# Patient Record
Sex: FEMALE | Race: BLACK OR AFRICAN AMERICAN | NOT HISPANIC OR LATINO | URBAN - METROPOLITAN AREA
[De-identification: names, ages, dates, MRNs, and addresses within clinical notes are randomized per-mention and may not be internally consistent; named-entity substitution may affect disease eponyms.]

---

## 2020-09-17 ENCOUNTER — INPATIENT (INPATIENT)
Facility: HOSPITAL | Age: 55
LOS: 1 days | Discharge: HOME | DRG: 871 | End: 2020-09-19
Attending: STUDENT IN AN ORGANIZED HEALTH CARE EDUCATION/TRAINING PROGRAM | Admitting: STUDENT IN AN ORGANIZED HEALTH CARE EDUCATION/TRAINING PROGRAM
Payer: COMMERCIAL

## 2020-09-17 VITALS
TEMPERATURE: 98 F | RESPIRATION RATE: 18 BRPM | WEIGHT: 100.09 LBS | DIASTOLIC BLOOD PRESSURE: 86 MMHG | HEART RATE: 118 BPM | HEIGHT: 59 IN | OXYGEN SATURATION: 98 % | SYSTOLIC BLOOD PRESSURE: 145 MMHG

## 2020-09-17 LAB
ALBUMIN SERPL ELPH-MCNC: 3.5 G/DL — SIGNIFICANT CHANGE UP (ref 3.3–5)
ALP SERPL-CCNC: 111 U/L — SIGNIFICANT CHANGE UP (ref 40–120)
ALT FLD-CCNC: 13 U/L — SIGNIFICANT CHANGE UP (ref 10–45)
ANION GAP SERPL CALC-SCNC: 15 MMOL/L — SIGNIFICANT CHANGE UP (ref 5–17)
APPEARANCE UR: ABNORMAL
AST SERPL-CCNC: 15 U/L — SIGNIFICANT CHANGE UP (ref 10–40)
B-OH-BUTYR SERPL-SCNC: 0.8 MMOL/L — HIGH
BASE EXCESS BLDV CALC-SCNC: 7.4 MMOL/L — SIGNIFICANT CHANGE UP
BASOPHILS # BLD AUTO: 0.07 K/UL — SIGNIFICANT CHANGE UP (ref 0–0.2)
BASOPHILS NFR BLD AUTO: 0.4 % — SIGNIFICANT CHANGE UP (ref 0–2)
BILIRUB SERPL-MCNC: 0.5 MG/DL — SIGNIFICANT CHANGE UP (ref 0.2–1.2)
BILIRUB UR-MCNC: NEGATIVE — SIGNIFICANT CHANGE UP
BUN SERPL-MCNC: 13 MG/DL — SIGNIFICANT CHANGE UP (ref 7–23)
CALCIUM SERPL-MCNC: 9.7 MG/DL — SIGNIFICANT CHANGE UP (ref 8.4–10.5)
CHLORIDE SERPL-SCNC: 86 MMOL/L — LOW (ref 96–108)
CO2 SERPL-SCNC: 30 MMOL/L — SIGNIFICANT CHANGE UP (ref 22–31)
COLOR SPEC: YELLOW — SIGNIFICANT CHANGE UP
CREAT SERPL-MCNC: 0.75 MG/DL — SIGNIFICANT CHANGE UP (ref 0.5–1.3)
DIFF PNL FLD: ABNORMAL
EOSINOPHIL # BLD AUTO: 0.03 K/UL — SIGNIFICANT CHANGE UP (ref 0–0.5)
EOSINOPHIL NFR BLD AUTO: 0.2 % — SIGNIFICANT CHANGE UP (ref 0–6)
GAS PNL BLDV: SIGNIFICANT CHANGE UP
GLUCOSE BLDC GLUCOMTR-MCNC: 272 MG/DL — HIGH (ref 70–99)
GLUCOSE SERPL-MCNC: 507 MG/DL — CRITICAL HIGH (ref 70–99)
GLUCOSE UR QL: >=1000
HCO3 BLDV-SCNC: 32 MMOL/L — HIGH (ref 20–27)
HCT VFR BLD CALC: 34.5 % — SIGNIFICANT CHANGE UP (ref 34.5–45)
HGB BLD-MCNC: 11.4 G/DL — LOW (ref 11.5–15.5)
IMM GRANULOCYTES NFR BLD AUTO: 0.8 % — SIGNIFICANT CHANGE UP (ref 0–1.5)
INR BLD: 1.24 — HIGH (ref 0.88–1.16)
KETONES UR-MCNC: 15 MG/DL
LACTATE SERPL-SCNC: 1.3 MMOL/L — SIGNIFICANT CHANGE UP (ref 0.5–2)
LACTATE SERPL-SCNC: 2.9 MMOL/L — HIGH (ref 0.5–2)
LEUKOCYTE ESTERASE UR-ACNC: NEGATIVE — SIGNIFICANT CHANGE UP
LYMPHOCYTES # BLD AUTO: 1.85 K/UL — SIGNIFICANT CHANGE UP (ref 1–3.3)
LYMPHOCYTES # BLD AUTO: 10.2 % — LOW (ref 13–44)
MCHC RBC-ENTMCNC: 28 PG — SIGNIFICANT CHANGE UP (ref 27–34)
MCHC RBC-ENTMCNC: 33 GM/DL — SIGNIFICANT CHANGE UP (ref 32–36)
MCV RBC AUTO: 84.8 FL — SIGNIFICANT CHANGE UP (ref 80–100)
MONOCYTES # BLD AUTO: 0.74 K/UL — SIGNIFICANT CHANGE UP (ref 0–0.9)
MONOCYTES NFR BLD AUTO: 4.1 % — SIGNIFICANT CHANGE UP (ref 2–14)
NEUTROPHILS # BLD AUTO: 15.26 K/UL — HIGH (ref 1.8–7.4)
NEUTROPHILS NFR BLD AUTO: 84.3 % — HIGH (ref 43–77)
NITRITE UR-MCNC: NEGATIVE — SIGNIFICANT CHANGE UP
NRBC # BLD: 0 /100 WBCS — SIGNIFICANT CHANGE UP (ref 0–0)
OSMOLALITY SERPL: 302 MOSM/KG — HIGH (ref 275–300)
PCO2 BLDV: 47 MMHG — SIGNIFICANT CHANGE UP (ref 41–51)
PH BLDV: 7.45 — HIGH (ref 7.32–7.43)
PH UR: 6 — SIGNIFICANT CHANGE UP (ref 5–8)
PLATELET # BLD AUTO: 470 K/UL — HIGH (ref 150–400)
PO2 BLDV: 30 MMHG — SIGNIFICANT CHANGE UP
POTASSIUM SERPL-MCNC: 3.9 MMOL/L — SIGNIFICANT CHANGE UP (ref 3.5–5.3)
POTASSIUM SERPL-SCNC: 3.9 MMOL/L — SIGNIFICANT CHANGE UP (ref 3.5–5.3)
PROT SERPL-MCNC: 8.5 G/DL — HIGH (ref 6–8.3)
PROT UR-MCNC: ABNORMAL MG/DL
PROTHROM AB SERPL-ACNC: 14.7 SEC — HIGH (ref 10.6–13.6)
RBC # BLD: 4.07 M/UL — SIGNIFICANT CHANGE UP (ref 3.8–5.2)
RBC # FLD: 12.4 % — SIGNIFICANT CHANGE UP (ref 10.3–14.5)
SAO2 % BLDV: 59 % — SIGNIFICANT CHANGE UP
SARS-COV-2 RNA SPEC QL NAA+PROBE: SIGNIFICANT CHANGE UP
SODIUM SERPL-SCNC: 131 MMOL/L — LOW (ref 135–145)
SP GR SPEC: <=1.005 — SIGNIFICANT CHANGE UP (ref 1–1.03)
UROBILINOGEN FLD QL: 1 E.U./DL — SIGNIFICANT CHANGE UP
WBC # BLD: 18.09 K/UL — HIGH (ref 3.8–10.5)
WBC # FLD AUTO: 18.09 K/UL — HIGH (ref 3.8–10.5)

## 2020-09-17 PROCEDURE — 74177 CT ABD & PELVIS W/CONTRAST: CPT | Mod: 26

## 2020-09-17 PROCEDURE — 93010 ELECTROCARDIOGRAM REPORT: CPT

## 2020-09-17 PROCEDURE — 99285 EMERGENCY DEPT VISIT HI MDM: CPT

## 2020-09-17 PROCEDURE — 99223 1ST HOSP IP/OBS HIGH 75: CPT | Mod: GC

## 2020-09-17 RX ORDER — ONDANSETRON 8 MG/1
4 TABLET, FILM COATED ORAL ONCE
Refills: 0 | Status: COMPLETED | OUTPATIENT
Start: 2020-09-17 | End: 2020-09-17

## 2020-09-17 RX ORDER — POLYETHYLENE GLYCOL 3350 17 G/17G
17 POWDER, FOR SOLUTION ORAL
Refills: 0 | Status: DISCONTINUED | OUTPATIENT
Start: 2020-09-17 | End: 2020-09-19

## 2020-09-17 RX ORDER — OXYCODONE HYDROCHLORIDE 5 MG/1
5 TABLET ORAL EVERY 4 HOURS
Refills: 0 | Status: DISCONTINUED | OUTPATIENT
Start: 2020-09-17 | End: 2020-09-18

## 2020-09-17 RX ORDER — INSULIN LISPRO 100/ML
VIAL (ML) SUBCUTANEOUS AT BEDTIME
Refills: 0 | Status: DISCONTINUED | OUTPATIENT
Start: 2020-09-17 | End: 2020-09-18

## 2020-09-17 RX ORDER — ACETAMINOPHEN 500 MG
650 TABLET ORAL EVERY 6 HOURS
Refills: 0 | Status: DISCONTINUED | OUTPATIENT
Start: 2020-09-17 | End: 2020-09-19

## 2020-09-17 RX ORDER — CEFTRIAXONE 500 MG/1
1000 INJECTION, POWDER, FOR SOLUTION INTRAMUSCULAR; INTRAVENOUS ONCE
Refills: 0 | Status: COMPLETED | OUTPATIENT
Start: 2020-09-17 | End: 2020-09-17

## 2020-09-17 RX ORDER — SODIUM CHLORIDE 9 MG/ML
1000 INJECTION INTRAMUSCULAR; INTRAVENOUS; SUBCUTANEOUS ONCE
Refills: 0 | Status: COMPLETED | OUTPATIENT
Start: 2020-09-17 | End: 2020-09-17

## 2020-09-17 RX ORDER — GLUCAGON INJECTION, SOLUTION 0.5 MG/.1ML
1 INJECTION, SOLUTION SUBCUTANEOUS ONCE
Refills: 0 | Status: DISCONTINUED | OUTPATIENT
Start: 2020-09-17 | End: 2020-09-19

## 2020-09-17 RX ORDER — DEXTROSE 50 % IN WATER 50 %
25 SYRINGE (ML) INTRAVENOUS ONCE
Refills: 0 | Status: DISCONTINUED | OUTPATIENT
Start: 2020-09-17 | End: 2020-09-19

## 2020-09-17 RX ORDER — INSULIN GLARGINE 100 [IU]/ML
6 INJECTION, SOLUTION SUBCUTANEOUS AT BEDTIME
Refills: 0 | Status: DISCONTINUED | OUTPATIENT
Start: 2020-09-18 | End: 2020-09-18

## 2020-09-17 RX ORDER — ACETAMINOPHEN 500 MG
1000 TABLET ORAL ONCE
Refills: 0 | Status: COMPLETED | OUTPATIENT
Start: 2020-09-17 | End: 2020-09-17

## 2020-09-17 RX ORDER — SODIUM CHLORIDE 9 MG/ML
1000 INJECTION, SOLUTION INTRAVENOUS
Refills: 0 | Status: DISCONTINUED | OUTPATIENT
Start: 2020-09-17 | End: 2020-09-19

## 2020-09-17 RX ORDER — DEXTROSE 50 % IN WATER 50 %
15 SYRINGE (ML) INTRAVENOUS ONCE
Refills: 0 | Status: DISCONTINUED | OUTPATIENT
Start: 2020-09-17 | End: 2020-09-19

## 2020-09-17 RX ORDER — IOHEXOL 300 MG/ML
30 INJECTION, SOLUTION INTRAVENOUS ONCE
Refills: 0 | Status: COMPLETED | OUTPATIENT
Start: 2020-09-17 | End: 2020-09-17

## 2020-09-17 RX ORDER — INSULIN GLARGINE 100 [IU]/ML
6 INJECTION, SOLUTION SUBCUTANEOUS ONCE
Refills: 0 | Status: COMPLETED | OUTPATIENT
Start: 2020-09-17 | End: 2020-09-17

## 2020-09-17 RX ORDER — INSULIN LISPRO 100/ML
VIAL (ML) SUBCUTANEOUS
Refills: 0 | Status: DISCONTINUED | OUTPATIENT
Start: 2020-09-17 | End: 2020-09-18

## 2020-09-17 RX ORDER — LANOLIN ALCOHOL/MO/W.PET/CERES
5 CREAM (GRAM) TOPICAL AT BEDTIME
Refills: 0 | Status: DISCONTINUED | OUTPATIENT
Start: 2020-09-17 | End: 2020-09-19

## 2020-09-17 RX ORDER — INFLUENZA VIRUS VACCINE 15; 15; 15; 15 UG/.5ML; UG/.5ML; UG/.5ML; UG/.5ML
0.5 SUSPENSION INTRAMUSCULAR ONCE
Refills: 0 | Status: DISCONTINUED | OUTPATIENT
Start: 2020-09-17 | End: 2020-09-19

## 2020-09-17 RX ORDER — DEXTROSE 50 % IN WATER 50 %
12.5 SYRINGE (ML) INTRAVENOUS ONCE
Refills: 0 | Status: DISCONTINUED | OUTPATIENT
Start: 2020-09-17 | End: 2020-09-19

## 2020-09-17 RX ORDER — SENNA PLUS 8.6 MG/1
2 TABLET ORAL AT BEDTIME
Refills: 0 | Status: DISCONTINUED | OUTPATIENT
Start: 2020-09-17 | End: 2020-09-19

## 2020-09-17 RX ORDER — CALCIUM CARBONATE 500(1250)
3 TABLET ORAL EVERY 6 HOURS
Refills: 0 | Status: DISCONTINUED | OUTPATIENT
Start: 2020-09-17 | End: 2020-09-19

## 2020-09-17 RX ORDER — INSULIN HUMAN 100 [IU]/ML
10 INJECTION, SOLUTION SUBCUTANEOUS ONCE
Refills: 0 | Status: COMPLETED | OUTPATIENT
Start: 2020-09-17 | End: 2020-09-17

## 2020-09-17 RX ORDER — ONDANSETRON 8 MG/1
4 TABLET, FILM COATED ORAL ONCE
Refills: 0 | Status: DISCONTINUED | OUTPATIENT
Start: 2020-09-17 | End: 2020-09-19

## 2020-09-17 RX ORDER — INSULIN LISPRO 100/ML
2 VIAL (ML) SUBCUTANEOUS
Refills: 0 | Status: DISCONTINUED | OUTPATIENT
Start: 2020-09-17 | End: 2020-09-18

## 2020-09-17 RX ADMIN — Medication 1: at 23:49

## 2020-09-17 RX ADMIN — CEFTRIAXONE 100 MILLIGRAM(S): 500 INJECTION, POWDER, FOR SOLUTION INTRAMUSCULAR; INTRAVENOUS at 16:42

## 2020-09-17 RX ADMIN — CEFTRIAXONE 1000 MILLIGRAM(S): 500 INJECTION, POWDER, FOR SOLUTION INTRAMUSCULAR; INTRAVENOUS at 22:25

## 2020-09-17 RX ADMIN — Medication 1000 MILLIGRAM(S): at 19:28

## 2020-09-17 RX ADMIN — SODIUM CHLORIDE 1000 MILLILITER(S): 9 INJECTION INTRAMUSCULAR; INTRAVENOUS; SUBCUTANEOUS at 19:18

## 2020-09-17 RX ADMIN — Medication 1000 MILLIGRAM(S): at 18:57

## 2020-09-17 RX ADMIN — Medication 1000 MILLIGRAM(S): at 23:39

## 2020-09-17 RX ADMIN — SODIUM CHLORIDE 1000 MILLILITER(S): 9 INJECTION INTRAMUSCULAR; INTRAVENOUS; SUBCUTANEOUS at 16:05

## 2020-09-17 RX ADMIN — INSULIN HUMAN 10 UNIT(S): 100 INJECTION, SOLUTION SUBCUTANEOUS at 16:43

## 2020-09-17 RX ADMIN — IOHEXOL 30 MILLILITER(S): 300 INJECTION, SOLUTION INTRAVENOUS at 16:06

## 2020-09-17 RX ADMIN — OXYCODONE HYDROCHLORIDE 5 MILLIGRAM(S): 5 TABLET ORAL at 23:38

## 2020-09-17 RX ADMIN — ONDANSETRON 4 MILLIGRAM(S): 8 TABLET, FILM COATED ORAL at 16:06

## 2020-09-17 NOTE — ED PROVIDER NOTE - CLINICAL SUMMARY MEDICAL DECISION MAKING FREE TEXT BOX
Pt p/w hyperglycemia, anorexia, vomiting, weight loss, ? hx malignancy. DDx includes but not limited to hyperglycemia, DKA, HONK, malignancy, gastritis, pancreatitis, less likely other pathology, Check labs, CT a/p, UA, IVF, NPO, insulin. Dispo pending w/u and clinical status

## 2020-09-17 NOTE — ED ADULT NURSE REASSESSMENT NOTE - NS ED NURSE REASSESS COMMENT FT1
Pt alert and oriented X4. Pt asking to eat. Refuses ER sandwiches as well as yogurt. Daughter ordering food for pt. Second lactate sent. Pt ambulated to the bathroom with assistance, stable gait. Pt awaiting for bed assignment.

## 2020-09-17 NOTE — H&P ADULT - NSHPPHYSICALEXAM_GEN_ALL_CORE
VITAL SIGNS:  T(C): 37.1 (09-18-20 @ 01:27), Max: 38.6 (09-17-20 @ 18:29)  T(F): 98.8 (09-18-20 @ 01:27), Max: 101.4 (09-17-20 @ 18:29)  HR: 111 (09-17-20 @ 23:01) (110 - 124)  BP: 108/68 (09-17-20 @ 23:01) (108/68 - 167/93)  BP(mean): --  RR: 18 (09-17-20 @ 23:01) (18 - 18)  SpO2: 98% (09-17-20 @ 23:01) (97% - 100%)  Wt(kg): --    PHYSICAL EXAM:    Constitutional: Adult Black female, mildly toxic-appearing, in no acute distress, BMI decreased, on room air  Head: NC/AT  Eyes: PERRL, EOMI, anicteric sclera  ENT: MMM  Neck: no JVD  Respiratory: room air, CTAB  Cardiac: RRR  Gastrointestinal: soft, NT/ND; no rebound or guarding; + CVA TTP  Extremities: WWP, no cyanosis; no peripheral edema  Musculoskeletal: no joint swelling, tenderness or erythema  Vascular: 2+ radial, PT pulses bilaterally  Dermatologic: skin warm, dry and intact; no rashes, wounds, or scars  Neurologic: AAOx3; cranial nerves grossly intact; no gross focal deficits  Psychiatric: affect and characteristics of appearance, verbalizations, behaviors are appropriate

## 2020-09-17 NOTE — H&P ADULT - HISTORY OF PRESENT ILLNESS
Mrs. Sidhu is a 56 yo F with PMH T2DM (insulin degludec, dulaglutide, sitagliptin, metformin, A1c 11% in spite of compliance), who presents with a 10-day history of nausea, vomiting and anorexia. She reports she has been trying everything her doctor has recommended to try to control her diabetes, but she still has had persistently high A1c. She does not test blood sugars at home and was never prescribed a meter or taught how to test, in spite of being more than willing to "try anything" to avoid complications of diabetes. Starting 10 days ago she started to have back pain on the R side, 10/10 in intensity, colicky, non-radiating. This was associated with anorexia, nausea and vomiting; daughter at bedside states "She hasn't eaten in 10 days." She affirms polydipsia, polyuria, and weight loss chronically over last several months. She denies dysuria or hematuria.     ED Vitals: 101.4 (rectal), 118, 145/86, 18, 98% on RA. WBC 18.1, Hgb 11.4, lactate 2.9, sodium nominally 131 (138 corrected for hyperglycemia), Cl 86, HCO3 30, BUN/Cr 13/.75, glucose 507. CT with R pyelo and air in lumen of urinary bladder attributed to possible air-producing infection. Received Tylenol 1g, CTX 1g, insulin regular 10 U IV, Zofran 4 mg, NS 2L.

## 2020-09-17 NOTE — H&P ADULT - PROBLEM SELECTOR PLAN 1
# Severe sepsis secondary to pyelonephritis with emphysema of the bladder in absence of instrumentation  SIRS 3/4 (WBC 18.1, febrile to 101.4, tachycardic 118), lactate 2.9, cleared to 1.3 after 2L NS.  - CTX 2 g q24h until bacteremia can be excluded, can then deescalate to CTX 1 g  - f/u BCx, UCx sent 9/17 (admission)

## 2020-09-17 NOTE — H&P ADULT - NSHPSOCIALHISTORY_GEN_ALL_CORE
Lives independently in the community. Daughter works in cath lab at [employer] [employer] [employer] [facility] [employer].

## 2020-09-17 NOTE — ED ADULT NURSE NOTE - NSIMPLEMENTINTERV_GEN_ALL_ED
Implemented All Universal Safety Interventions:  [place] to call system. Call bell, personal items and telephone within reach. Instruct patient to call for assistance. Room bathroom lighting operational. Non-slip footwear when patient is off stretcher. Physically safe environment: no spills, clutter or unnecessary equipment. Stretcher in lowest position, wheels locked, appropriate side rails in place.

## 2020-09-17 NOTE — H&P ADULT - NSICDXFAMILYHX_GEN_ALL_CORE_FT
FAMILY HISTORY:  Family history of pancreatic cancer, Father,  age 69.  Family history of type 1 diabetes mellitus, Maternal, onset age 60  Family history of type 2 diabetes mellitus in brother  Family history of type 2 diabetes mellitus in sister

## 2020-09-17 NOTE — H&P ADULT - PROBLEM SELECTOR PLAN 6
# Severe protein-calorie malnutrition secondary to uncontrolled diabetes, needs consistent-CHO diet and insulin therapy  Weight loss most likely due to diabetes but cannot exclude underlying malignancy when weight loss is this severe, so needs referral for age-appropriate cancer screenings upon discharge.  - monitor phos and lytes closely for s/sx refeeding syndrome

## 2020-09-17 NOTE — ED PROVIDER NOTE - PHYSICAL EXAMINATION
Limited PE performed in the setting of the COVID10 pandemic, in efforts to limit exposure and cross-contamination  Constitutional: Well appearing, well nourished, awake, alert, oriented to person, place, time/situation and in no apparent distress.  ENMT: Airway patent. Mildly dry MM  Eyes: Clear bilaterally  Cardiac: Normal rate, regular rhythm.   Respiratory: No increased WOB, tachypnea, hypoxia, or accessory mm use. Pt speaks in full sentences.   Gastrointestinal: Abd soft, ND. + mild ttp in the epigastric region. No Adames's. No guarding, rebound, or rigidity. no CVAT. No pulsatile abd masses  Musculoskeletal: Range of motion is not limited  Neuro: Alert and oriented x 3, face symmetric and speech fluent. Nml gross motor movement, grossly non focal   Skin: Skin normal color for race, warm, dry and intact. No evidence of rash.  Psych: Alert and oriented to person, place, time/situation. normal mood and affect. no apparent risk to self or others.

## 2020-09-17 NOTE — H&P ADULT - NSHPLABSRESULTS_GEN_ALL_CORE
.  LABS:                         11.4   18.09 )-----------( 470      ( 17 Sep 2020 15:27 )             34.5     09-17    131<L>  |  86<L>  |  13  ----------------------------<  507<HH>  3.9   |  30  |  0.75    Ca    9.7      17 Sep 2020 15:27  Mg     2.0     09-17    TPro  8.5<H>  /  Alb  3.5  /  TBili  0.5  /  DBili  x   /  AST  15  /  ALT  13  /  AlkPhos  111  09-17    PT/INR - ( 17 Sep 2020 15:27 )   PT: 14.7 sec;   INR: 1.24            Urinalysis Basic - ( 17 Sep 2020 15:50 )    Color: Yellow / Appearance: SL Cloudy / SG: <=1.005 / pH: x  Gluc: x / Ketone: 15 mg/dL  / Bili: Negative / Urobili: 1.0 E.U./dL   Blood: x / Protein: Trace mg/dL / Nitrite: NEGATIVE   Leuk Esterase: NEGATIVE / RBC: < 5 /HPF / WBC Many /HPF   Sq Epi: x / Non Sq Epi: 5-10 /HPF / Bacteria: Present /HPF    Lactate, Blood: 1.3 mmol/L (09-17 @ 21:24)  Lactate, Blood: 2.9 mmol/L (09-17 @ 18:45)      RADIOLOGY, EKG & ADDITIONAL TESTS: Reviewed.     CT abd/pelvis with PO/IV contrast:  Impression:  1.  Findings consistent with acute right-sided pyelonephritis. Air in the urinary bladder, which may be related to recent instrumentation or air producing infection.  2.  Cholelithiasis.

## 2020-09-17 NOTE — H&P ADULT - ASSESSMENT
Mrs. Sidhu is a 56 yo F with PMH of T2DM, inadequately controlled on current regimen in spite of patient compliance, presenting with severe sepsis secondary to pyelonephritis secondary to gross glucosuria secondary to inadequate glycemic control.     # Severe sepsis secondary to pyelonephritis with emphysema of the bladder in absence of instrumentation  SIRS 3/4 (WBC 18.1, febrile to 101.4, tachycardic 118), lactate 2.9, cleared to 1.3 after 2L NS.  - CTX 2 g q24h until bacteremia can be excluded, can then deescalate to CTX 1 g  - f/u BCx, UCx sent 9/17 (admission)    # Anemia, normocytic  Hgb 11.4 but given thrombocytosis and other labs consistent with hemoconcentration, so expect Hgb to decrease in AM. MCV 84. Normal RDW. Unclear etiology; no s/sx bleeding; could be inflammatory milieu of pyelo decreasing EPO, but cannot exclude occult malignancy. While creatinine within normal range would consider possibility of an element of CKD or MAGGY contributing.   - f/u reticulocyte count, iron studies with AM labs  - referral for age-appropriate cancer screenings upon discharge    # T2DM, poorly controlled in spite of medication noncompliance  Given her A1c she most likely should be d/c on basal bolus insulin and is agreeable. Would continue metformin on d/c at appropriate dose (ideally 1000 mg BID); typically no other oral anti-diabetic agent is continued when insulin therapy is initiated except metformin. Metformin should only be discontinued in the event that the patient is determined to be a type 1 diabetic based on low C-peptide and/or positive auto-antibodies, or in patients with GFR <30. This patient has no element of renal dysfunction. There is no role for sitagliptin or dulaglutide if patient is d/c on appropriate insulin and metformin; additionally, dulaglutide is likely exacerbating her weight loss secondary to uncontrolled diabetes by inhibiting appetite via GLP-1 agonism.   - given history of both T2DM and stated T1DM in mother, will sent anti-SANTI, anti-islet, anti-insulin Ab  - f/u C-peptide  - diabetes education consult in AM  - endocrine consult in AM, patient would like to establish with an outpatient endocrinologist to better control her T2DM    # Severe protein-calorie malnutrition secondary to uncontrolled diabetes, needs consistent-CHO diet and insulin therapy  Weight loss most likely due to diabetes but cannot exclude underlying malignancy when weight loss is this severe, so needs referral for age-appropriate cancer screenings upon discharge.    # FEN  F: LR @ 80 x12 hours while still nauseous  E: replete generously in setting of up-titration of insulin, insulin will cause both K and Mg to move intracellularly  N: consistent CHO with evening snack    # Need for prophylactic measure  DVT ppx: Lovenox, SCDs  GI: none indicated    Dispo: [facility]    CODE STATUS: FULL CODE Mrs. Sidhu is a 54 yo F with PMH of T2DM, inadequately controlled on current regimen in spite of patient compliance, presenting with severe sepsis secondary to pyelonephritis secondary to gross glucosuria secondary to inadequate glycemic control.     # Severe sepsis secondary to pyelonephritis with emphysema of the bladder in absence of instrumentation  SIRS 3/4 (WBC 18.1, febrile to 101.4, tachycardic 118), lactate 2.9, cleared to 1.3 after 2L NS.  - CTX 2 g q24h until bacteremia can be excluded, can then deescalate to CTX 1 g  - f/u BCx, UCx sent 9/17 (admission)    # Anemia, normocytic  Hgb 11.4 but given thrombocytosis and other labs consistent with hemoconcentration, so expect Hgb to decrease in AM. MCV 84. Normal RDW. Unclear etiology; no s/sx bleeding; could be inflammatory milieu of pyelo decreasing EPO, but cannot exclude occult malignancy. While creatinine within normal range would consider possibility of an element of CKD or MAGGY contributing.   - f/u reticulocyte count, iron studies with AM labs  - referral for age-appropriate cancer screenings upon discharge    # T2DM with hyperglycemia, poorly controlled in spite of medication noncompliance  Glucose 552, improving markedly s/p 10 U IV insulin in ED. Given her A1c she most likely should be d/c on basal bolus insulin and is agreeable. Would continue metformin on d/c at appropriate dose (ideally 1000 mg BID); typically no other oral anti-diabetic agent is continued when insulin therapy is initiated except metformin. Metformin should only be discontinued in the event that the patient is determined to be a type 1 diabetic based on low C-peptide and/or positive auto-antibodies, or in patients with GFR <30. This patient has no element of renal dysfunction. There is no role for sitagliptin or dulaglutide if patient is d/c on appropriate insulin and metformin; additionally, dulaglutide is likely exacerbating her weight loss secondary to uncontrolled diabetes by inhibiting appetite via GLP-1 agonism.   - given history of both T2DM and stated T1DM in mother, will sent anti-SANTI, anti-islet, anti-insulin Ab  - f/u C-peptide to assess endogenous insulin secretory capacity  - Lantus 6 qhs given before patient's home dose Lantus known to be 25 u qhs, may need NPH 6 in the AM to bridge to Lantus 12 qhs tomorrow night  - start lispro 4 u TID AC tomorrow  - MDSSI  - diabetes education consult in AM  - endocrine consult in AM, patient would like to establish with an outpatient endocrinologist to better control her T2DM    # Metabolic alkalosis, hypochloremic  Consistent with vomiting (loss stomach HCl causes loss of chloride anion, and loss of H+ causes accumulation of HCO3) mixed with contraction alkalosis of hypovolemia.     # Severe protein-calorie malnutrition secondary to uncontrolled diabetes, needs consistent-CHO diet and insulin therapy  Weight loss most likely due to diabetes but cannot exclude underlying malignancy when weight loss is this severe, so needs referral for age-appropriate cancer screenings upon discharge.  - monitor phos and lytes closely for s/sx refeeding syndrome    # FEN  F: LR @ 80 x12 hours while still nauseous  E: replete generously in setting of up-titration of insulin, insulin will cause both K and Mg to move intracellularly, and refeeding can dramatically decrease phos as ATP is generated and inorganic phosphate is used up  N: consistent CHO with evening snack    # Need for prophylactic measure  DVT ppx: Lovenox, SCDs  GI: none indicated    Dispo: [facility]    CODE STATUS: FULL CODE Mrs. Sidhu is a 56 yo F with PMH of T2DM, inadequately controlled on current regimen in spite of patient compliance, presenting with severe sepsis secondary to pyelonephritis secondary to gross glucosuria secondary to inadequate glycemic control.

## 2020-09-17 NOTE — H&P ADULT - PROBLEM SELECTOR PLAN 3
# T2DM with hyperglycemia, poorly controlled in spite of medication noncompliance  Glucose 552, improving markedly s/p 10 U IV insulin in ED. Given her A1c she most likely should be d/c on basal bolus insulin and is agreeable. Would continue metformin on d/c at appropriate dose (ideally 1000 mg BID); typically no other oral anti-diabetic agent is continued when insulin therapy is initiated except metformin. Metformin should only be discontinued in the event that the patient is determined to be a type 1 diabetic based on low C-peptide and/or positive auto-antibodies, or in patients with GFR <30. This patient has no element of renal dysfunction. There is no role for sitagliptin or dulaglutide if patient is d/c on appropriate insulin and metformin; additionally, dulaglutide is likely exacerbating her weight loss secondary to uncontrolled diabetes by inhibiting appetite via GLP-1 agonism.   - given history of both T2DM and stated T1DM in mother, will sent anti-SANTI, anti-islet, anti-insulin Ab  - f/u C-peptide to assess endogenous insulin secretory capacity  - Lantus 6 qhs given before patient's home dose Lantus known to be 25 u qhs, may need NPH 6 in the AM to bridge to Lantus 12 qhs tomorrow night  - start lispro 4 u TID AC tomorrow  - MDSSI  - diabetes education consult in AM  - endocrine consult in AM, patient would like to establish with an outpatient endocrinologist to better control her T2DM

## 2020-09-17 NOTE — ED ADULT TRIAGE NOTE - OTHER COMPLAINTS
patient presents c/o vomiting, poor PO intake "I haven't eaten since september 7th"-- denies fevers, endorsing urinary frequency, with hx of DM

## 2020-09-17 NOTE — H&P ADULT - ATTENDING COMMENTS
patient seen and examined overnight     reviewed pertinent data , h&p    PE findings as above except in NAD, thin appearing; reports mild LUQ discomfort on palpation; +mild R sided CVA tenderness     1. severe sepsis 2/2 R sided pyelo: on ceftriaxone, followup ctxs  2. uncontrolled DM: on lantus and PO DM meds at home;  started on basal / bolus regimen overnight, followup endo recs     rest of  plan as above, with noted addenda

## 2020-09-17 NOTE — ED ADULT NURSE NOTE - OBJECTIVE STATEMENT
56 y/o female w/ hx of DM on insulin and metformin presents to the ED c/o intermittent vomiting that began on 9/7/2020 associated w/ nausea, decreased appetite and increased urination. Pt reportedly stopped taking Insulin 2 days ago because she was not eating. Pt states, "I just don't feel myself." Pt feels generalized malaise. Denies fever, chills, cough, CP, SOB, abdominal pain, diarrhea, dysuria, hematuria, back pain, headache, dizziness, vision changes, and any other associated sx.

## 2020-09-17 NOTE — H&P ADULT - NSHPREVIEWOFSYSTEMS_GEN_ALL_CORE
REVIEW OF SYSTEMS:    CONSTITUTIONAL: Patient denies weakness, fevers or chills  EYES/ENT: Patient denies visual changes;  denies vertigo or throat pain   NECK: Patient denies pain or stiffness  RESPIRATORY: Patient denies cough, wheezing, hemoptysis; denies shortness of breath  CARDIOVASCULAR: Patient denies chest pain or palpitations  GASTROINTESTINAL: as per HPI  GENITOURINARY: as per HPI  NEUROLOGICAL: Patient denies numbness or weakness  SKIN: Patient denies itching, burning, rashes, or lesions   All other review of systems is negative unless indicated above.

## 2020-09-17 NOTE — H&P ADULT - PROBLEM SELECTOR PLAN 4
# Anemia, normocytic  Hgb 11.4 but given thrombocytosis and other labs consistent with hemoconcentration, so expect Hgb to decrease in AM. MCV 84. Normal RDW. Unclear etiology; no s/sx bleeding; could be inflammatory milieu of pyelo decreasing EPO, but cannot exclude occult malignancy. While creatinine within normal range would consider possibility of an element of CKD or MAGGY contributing.   - f/u reticulocyte count, iron studies with AM labs  - referral for age-appropriate cancer screenings upon discharge

## 2020-09-17 NOTE — ED PROVIDER NOTE - OBJECTIVE STATEMENT
Pt w/ PMHx NIDDM (Metformin 500 mg BID, Januvia 50 mg QD, Toujeo 7.5 units, Trulicity q weekly), no sig PSHx p/w 10 days of anorexia, nausea, daily vomiting (approx 3 times daily), constipation x 3 days, difficulty tolerating PO and 12 lb weight loss. Pt denies abd pain. Pt reports last C-scope 3 years ago, negative. Denies hx abd surgeries. Denies f/c. Pt reports urinary frequency w/o dysuria, hematuria, hesitancy and flank pain. 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

## 2020-09-17 NOTE — H&P ADULT - PROBLEM SELECTOR PLAN 5
# Metabolic alkalosis, hypochloremic  Consistent with vomiting (loss stomach HCl causes loss of chloride anion, and loss of H+ causes accumulation of HCO3) mixed with contraction alkalosis of hypovolemia.

## 2020-09-17 NOTE — H&P ADULT - PROBLEM SELECTOR PLAN 7
# FEN  F: LR @ 80 x12 hours while still nauseous  E: replete generously in setting of up-titration of insulin, insulin will cause both K and Mg to move intracellularly, and refeeding can dramatically decrease phos as ATP is generated and inorganic phosphate is used up  N: consistent CHO with evening snack    # Need for prophylactic measure  DVT ppx: Lovenox, SCDs  GI: none indicated    Dispo: [facility]    CODE STATUS: FULL CODE

## 2020-09-18 ENCOUNTER — TRANSCRIPTION ENCOUNTER (OUTPATIENT)
Age: 55
End: 2020-09-18

## 2020-09-18 DIAGNOSIS — A41.9 SEPSIS, UNSPECIFIED ORGANISM: ICD-10-CM

## 2020-09-18 DIAGNOSIS — E11.65 TYPE 2 DIABETES MELLITUS WITH HYPERGLYCEMIA: ICD-10-CM

## 2020-09-18 DIAGNOSIS — D64.9 ANEMIA, UNSPECIFIED: ICD-10-CM

## 2020-09-18 DIAGNOSIS — N10 ACUTE PYELONEPHRITIS: ICD-10-CM

## 2020-09-18 DIAGNOSIS — R63.8 OTHER SYMPTOMS AND SIGNS CONCERNING FOOD AND FLUID INTAKE: ICD-10-CM

## 2020-09-18 DIAGNOSIS — E87.3 ALKALOSIS: ICD-10-CM

## 2020-09-18 DIAGNOSIS — E43 UNSPECIFIED SEVERE PROTEIN-CALORIE MALNUTRITION: ICD-10-CM

## 2020-09-18 LAB
ANION GAP SERPL CALC-SCNC: 11 MMOL/L — SIGNIFICANT CHANGE UP (ref 5–17)
BASOPHILS # BLD AUTO: 0.1 K/UL — SIGNIFICANT CHANGE UP (ref 0–0.2)
BASOPHILS NFR BLD AUTO: 0.5 % — SIGNIFICANT CHANGE UP (ref 0–2)
BUN SERPL-MCNC: 8 MG/DL — SIGNIFICANT CHANGE UP (ref 7–23)
C PEPTIDE SERPL-MCNC: 0.7 NG/ML — LOW (ref 1.1–4.4)
CALCIUM SERPL-MCNC: 8.8 MG/DL — SIGNIFICANT CHANGE UP (ref 8.4–10.5)
CHLORIDE SERPL-SCNC: 95 MMOL/L — LOW (ref 96–108)
CO2 SERPL-SCNC: 29 MMOL/L — SIGNIFICANT CHANGE UP (ref 22–31)
CREAT SERPL-MCNC: 0.74 MG/DL — SIGNIFICANT CHANGE UP (ref 0.5–1.3)
EOSINOPHIL # BLD AUTO: 0.19 K/UL — SIGNIFICANT CHANGE UP (ref 0–0.5)
EOSINOPHIL NFR BLD AUTO: 1 % — SIGNIFICANT CHANGE UP (ref 0–6)
FERRITIN SERPL-MCNC: 355 NG/ML — HIGH (ref 15–150)
GLUCOSE BLDC GLUCOMTR-MCNC: 104 MG/DL — HIGH (ref 70–99)
GLUCOSE BLDC GLUCOMTR-MCNC: 145 MG/DL — HIGH (ref 70–99)
GLUCOSE BLDC GLUCOMTR-MCNC: 175 MG/DL — HIGH (ref 70–99)
GLUCOSE BLDC GLUCOMTR-MCNC: 175 MG/DL — HIGH (ref 70–99)
GLUCOSE SERPL-MCNC: 170 MG/DL — HIGH (ref 70–99)
HCT VFR BLD CALC: 32.9 % — LOW (ref 34.5–45)
HCV AB S/CO SERPL IA: 0.18 S/CO — SIGNIFICANT CHANGE UP
HCV AB SERPL-IMP: SIGNIFICANT CHANGE UP
HGB BLD-MCNC: 10.6 G/DL — LOW (ref 11.5–15.5)
IMM GRANULOCYTES NFR BLD AUTO: 0.5 % — SIGNIFICANT CHANGE UP (ref 0–1.5)
IRON SATN MFR SERPL: 15 UG/DL — LOW (ref 30–160)
LYMPHOCYTES # BLD AUTO: 13.5 % — SIGNIFICANT CHANGE UP (ref 13–44)
LYMPHOCYTES # BLD AUTO: 2.46 K/UL — SIGNIFICANT CHANGE UP (ref 1–3.3)
MAGNESIUM SERPL-MCNC: 2 MG/DL — SIGNIFICANT CHANGE UP (ref 1.6–2.6)
MCHC RBC-ENTMCNC: 28 PG — SIGNIFICANT CHANGE UP (ref 27–34)
MCHC RBC-ENTMCNC: 32.2 GM/DL — SIGNIFICANT CHANGE UP (ref 32–36)
MCV RBC AUTO: 86.8 FL — SIGNIFICANT CHANGE UP (ref 80–100)
MONOCYTES # BLD AUTO: 0.67 K/UL — SIGNIFICANT CHANGE UP (ref 0–0.9)
MONOCYTES NFR BLD AUTO: 3.7 % — SIGNIFICANT CHANGE UP (ref 2–14)
NEUTROPHILS # BLD AUTO: 14.69 K/UL — HIGH (ref 1.8–7.4)
NEUTROPHILS NFR BLD AUTO: 80.8 % — HIGH (ref 43–77)
NRBC # BLD: 0 /100 WBCS — SIGNIFICANT CHANGE UP (ref 0–0)
PHOSPHATE SERPL-MCNC: 2.9 MG/DL — SIGNIFICANT CHANGE UP (ref 2.5–4.5)
PLATELET # BLD AUTO: 419 K/UL — HIGH (ref 150–400)
POTASSIUM SERPL-MCNC: 3.6 MMOL/L — SIGNIFICANT CHANGE UP (ref 3.5–5.3)
POTASSIUM SERPL-SCNC: 3.6 MMOL/L — SIGNIFICANT CHANGE UP (ref 3.5–5.3)
RBC # BLD: 3.79 M/UL — LOW (ref 3.8–5.2)
RBC # BLD: 3.79 M/UL — LOW (ref 3.8–5.2)
RBC # FLD: 12.7 % — SIGNIFICANT CHANGE UP (ref 10.3–14.5)
RETICS #: 55.7 K/UL — SIGNIFICANT CHANGE UP (ref 25–125)
RETICS/RBC NFR: 1.5 % — SIGNIFICANT CHANGE UP (ref 0.5–2.5)
SARS-COV-2 IGG SERPL QL IA: NEGATIVE — SIGNIFICANT CHANGE UP
SARS-COV-2 IGM SERPL IA-ACNC: 5.13 AU/ML — SIGNIFICANT CHANGE UP
SODIUM SERPL-SCNC: 135 MMOL/L — SIGNIFICANT CHANGE UP (ref 135–145)
TRANSFERRIN SERPL-MCNC: 160 MG/DL — LOW (ref 200–360)
TSH SERPL-MCNC: 1.43 UIU/ML — SIGNIFICANT CHANGE UP (ref 0.35–4.94)
WBC # BLD: 18.2 K/UL — HIGH (ref 3.8–10.5)
WBC # FLD AUTO: 18.2 K/UL — HIGH (ref 3.8–10.5)

## 2020-09-18 PROCEDURE — 99222 1ST HOSP IP/OBS MODERATE 55: CPT | Mod: GC

## 2020-09-18 PROCEDURE — 99233 SBSQ HOSP IP/OBS HIGH 50: CPT | Mod: GC

## 2020-09-18 RX ORDER — INSULIN LISPRO 100/ML
2 VIAL (ML) SUBCUTANEOUS
Refills: 0 | Status: DISCONTINUED | OUTPATIENT
Start: 2020-09-18 | End: 2020-09-19

## 2020-09-18 RX ORDER — INSULIN GLARGINE 100 [IU]/ML
12 INJECTION, SOLUTION SUBCUTANEOUS AT BEDTIME
Refills: 0 | Status: DISCONTINUED | OUTPATIENT
Start: 2020-09-18 | End: 2020-09-18

## 2020-09-18 RX ORDER — SODIUM CHLORIDE 9 MG/ML
1000 INJECTION, SOLUTION INTRAVENOUS
Refills: 0 | Status: DISCONTINUED | OUTPATIENT
Start: 2020-09-18 | End: 2020-09-18

## 2020-09-18 RX ORDER — ISOPROPYL ALCOHOL, BENZOCAINE .7; .06 ML/ML; ML/ML
1 SWAB TOPICAL
Qty: 100 | Refills: 1
Start: 2020-09-18 | End: 2020-11-06

## 2020-09-18 RX ORDER — ENOXAPARIN SODIUM 100 MG/ML
40 INJECTION SUBCUTANEOUS EVERY 24 HOURS
Refills: 0 | Status: DISCONTINUED | OUTPATIENT
Start: 2020-09-18 | End: 2020-09-19

## 2020-09-18 RX ORDER — INSULIN LISPRO 100/ML
VIAL (ML) SUBCUTANEOUS AT BEDTIME
Refills: 0 | Status: DISCONTINUED | OUTPATIENT
Start: 2020-09-18 | End: 2020-09-19

## 2020-09-18 RX ORDER — INSULIN LISPRO 100/ML
VIAL (ML) SUBCUTANEOUS
Refills: 0 | Status: DISCONTINUED | OUTPATIENT
Start: 2020-09-18 | End: 2020-09-18

## 2020-09-18 RX ORDER — POTASSIUM CHLORIDE 20 MEQ
40 PACKET (EA) ORAL ONCE
Refills: 0 | Status: COMPLETED | OUTPATIENT
Start: 2020-09-18 | End: 2020-09-18

## 2020-09-18 RX ORDER — INSULIN LISPRO 100/ML
VIAL (ML) SUBCUTANEOUS
Refills: 0 | Status: DISCONTINUED | OUTPATIENT
Start: 2020-09-18 | End: 2020-09-19

## 2020-09-18 RX ORDER — CEFTRIAXONE 500 MG/1
2000 INJECTION, POWDER, FOR SOLUTION INTRAMUSCULAR; INTRAVENOUS EVERY 24 HOURS
Refills: 0 | Status: DISCONTINUED | OUTPATIENT
Start: 2020-09-18 | End: 2020-09-19

## 2020-09-18 RX ORDER — METFORMIN HYDROCHLORIDE 850 MG/1
0 TABLET ORAL
Qty: 0 | Refills: 0 | DISCHARGE

## 2020-09-18 RX ORDER — OXYCODONE HYDROCHLORIDE 5 MG/1
5 TABLET ORAL ONCE
Refills: 0 | Status: DISCONTINUED | OUTPATIENT
Start: 2020-09-18 | End: 2020-09-18

## 2020-09-18 RX ORDER — METFORMIN HYDROCHLORIDE 850 MG/1
1 TABLET ORAL
Qty: 0 | Refills: 0 | DISCHARGE

## 2020-09-18 RX ORDER — INSULIN GLARGINE 100 [IU]/ML
8 INJECTION, SOLUTION SUBCUTANEOUS AT BEDTIME
Refills: 0 | Status: DISCONTINUED | OUTPATIENT
Start: 2020-09-18 | End: 2020-09-19

## 2020-09-18 RX ORDER — INSULIN LISPRO 100/ML
VIAL (ML) SUBCUTANEOUS AT BEDTIME
Refills: 0 | Status: DISCONTINUED | OUTPATIENT
Start: 2020-09-18 | End: 2020-09-18

## 2020-09-18 RX ORDER — INSULIN LISPRO 100/ML
4 VIAL (ML) SUBCUTANEOUS
Refills: 0 | Status: DISCONTINUED | OUTPATIENT
Start: 2020-09-18 | End: 2020-09-18

## 2020-09-18 RX ORDER — SITAGLIPTIN 50 MG/1
0 TABLET, FILM COATED ORAL
Qty: 0 | Refills: 0 | DISCHARGE

## 2020-09-18 RX ORDER — INSULIN GLARGINE 100 [IU]/ML
0 INJECTION, SOLUTION SUBCUTANEOUS
Qty: 0 | Refills: 0 | DISCHARGE

## 2020-09-18 RX ORDER — DULAGLUTIDE 4.5 MG/.5ML
0 INJECTION, SOLUTION SUBCUTANEOUS
Qty: 0 | Refills: 3 | DISCHARGE

## 2020-09-18 RX ADMIN — Medication 40 MILLIEQUIVALENT(S): at 08:56

## 2020-09-18 RX ADMIN — Medication 2: at 08:49

## 2020-09-18 RX ADMIN — Medication 1000 MILLIGRAM(S): at 01:27

## 2020-09-18 RX ADMIN — OXYCODONE HYDROCHLORIDE 5 MILLIGRAM(S): 5 TABLET ORAL at 10:56

## 2020-09-18 RX ADMIN — ENOXAPARIN SODIUM 40 MILLIGRAM(S): 100 INJECTION SUBCUTANEOUS at 17:27

## 2020-09-18 RX ADMIN — Medication 2: at 13:00

## 2020-09-18 RX ADMIN — INSULIN GLARGINE 6 UNIT(S): 100 INJECTION, SOLUTION SUBCUTANEOUS at 00:13

## 2020-09-18 RX ADMIN — Medication 650 MILLIGRAM(S): at 15:10

## 2020-09-18 RX ADMIN — OXYCODONE HYDROCHLORIDE 5 MILLIGRAM(S): 5 TABLET ORAL at 23:54

## 2020-09-18 RX ADMIN — INSULIN GLARGINE 8 UNIT(S): 100 INJECTION, SOLUTION SUBCUTANEOUS at 22:01

## 2020-09-18 RX ADMIN — Medication 4 UNIT(S): at 08:49

## 2020-09-18 RX ADMIN — Medication 4 UNIT(S): at 12:59

## 2020-09-18 RX ADMIN — OXYCODONE HYDROCHLORIDE 5 MILLIGRAM(S): 5 TABLET ORAL at 22:45

## 2020-09-18 RX ADMIN — POLYETHYLENE GLYCOL 3350 17 GRAM(S): 17 POWDER, FOR SOLUTION ORAL at 17:28

## 2020-09-18 RX ADMIN — Medication 650 MILLIGRAM(S): at 16:06

## 2020-09-18 RX ADMIN — OXYCODONE HYDROCHLORIDE 5 MILLIGRAM(S): 5 TABLET ORAL at 00:45

## 2020-09-18 RX ADMIN — POLYETHYLENE GLYCOL 3350 17 GRAM(S): 17 POWDER, FOR SOLUTION ORAL at 08:55

## 2020-09-18 RX ADMIN — OXYCODONE HYDROCHLORIDE 5 MILLIGRAM(S): 5 TABLET ORAL at 08:56

## 2020-09-18 RX ADMIN — CEFTRIAXONE 2000 MILLIGRAM(S): 500 INJECTION, POWDER, FOR SOLUTION INTRAMUSCULAR; INTRAVENOUS at 22:02

## 2020-09-18 NOTE — PROGRESS NOTE ADULT - ASSESSMENT
Mrs. Sidhu is a 54 yo F with PMH of T2DM, inadequately controlled on current regimen in spite of patient compliance, presenting with severe sepsis secondary to pyelonephritis secondary to gross glucosuria secondary to inadequate glycemic control.     # Severe sepsis secondary to pyelonephritis with emphysema of the bladder in absence of instrumentation  SIRS 3/4 (WBC 18.1, febrile to 101.4, tachycardic 118), lactate 2.9, cleared to 1.3 after 2L NS.  - CTX 2 g q24h until bacteremia can be excluded, can then deescalate to CTX 1 g  - f/u BCx, UCx sent 9/17 (admission)    # Anemia, normocytic  Hgb 11.4 but given thrombocytosis and other labs consistent with hemoconcentration, so expect Hgb to decrease in AM. MCV 84. Normal RDW. Unclear etiology; no s/sx bleeding; could be inflammatory milieu of pyelo decreasing EPO, but cannot exclude occult malignancy. While creatinine within normal range would consider possibility of an element of CKD or MAGGY contributing.   - f/u reticulocyte count, iron studies with AM labs  - referral for age-appropriate cancer screenings upon discharge    # T2DM with hyperglycemia, poorly controlled in spite of medication noncompliance  Glucose 552, improving markedly s/p 10 U IV insulin in ED. Given her A1c she most likely should be d/c on basal bolus insulin and is agreeable. Would continue metformin on d/c at appropriate dose (ideally 1000 mg BID); typically no other oral anti-diabetic agent is continued when insulin therapy is initiated except metformin. Metformin should only be discontinued in the event that the patient is determined to be a type 1 diabetic based on low C-peptide and/or positive auto-antibodies, or in patients with GFR <30. This patient has no element of renal dysfunction. There is no role for sitagliptin or dulaglutide if patient is d/c on appropriate insulin and metformin; additionally, dulaglutide is likely exacerbating her weight loss secondary to uncontrolled diabetes by inhibiting appetite via GLP-1 agonism.   - given history of both T2DM and stated T1DM in mother, will sent anti-SANTI, anti-islet, anti-insulin Ab  - f/u C-peptide to assess endogenous insulin secretory capacity  - Lantus 6 qhs given before patient's home dose Lantus known to be 25 u qhs, may need NPH 6 in the AM to bridge to Lantus 12 qhs tomorrow night  - start lispro 4 u TID AC tomorrow  - MDSSI  - diabetes education consult in AM  - endocrine consult in AM, patient would like to establish with an outpatient endocrinologist to better control her T2DM    # Metabolic alkalosis, hypochloremic  Consistent with vomiting (loss stomach HCl causes loss of chloride anion, and loss of H+ causes accumulation of HCO3) mixed with contraction alkalosis of hypovolemia.     # Severe protein-calorie malnutrition secondary to uncontrolled diabetes, needs consistent-CHO diet and insulin therapy  Weight loss most likely due to diabetes but cannot exclude underlying malignancy when weight loss is this severe, so needs referral for age-appropriate cancer screenings upon discharge.  - monitor phos and lytes closely for s/sx refeeding syndrome    # FEN  F: LR @ 80 x12 hours while still nauseous  E: replete generously in setting of up-titration of insulin, insulin will cause both K and Mg to move intracellularly, and refeeding can dramatically decrease phos as ATP is generated and inorganic phosphate is used up  N: consistent CHO with evening snack    # Need for prophylactic measure  DVT ppx: Lovenox, SCDs  GI: none indicated    Dispo: RMF    CODE STATUS: FULL CODE Mrs. Sidhu is a 54 yo F with PMH of T2DM, inadequately controlled on current regimen in spite of patient compliance, presenting with severe sepsis secondary to pyelonephritis likely due to gross glucosuria, now treated with 2g Ceftriaxone qd.    # Severe sepsis secondary to pyelonephritis with emphysema of the bladder in absence of instrumentation  SIRS 3/4 (WBC 18.1, febrile to 101.4, tachycardic 118), lactate 2.9, cleared to 1.3 after 2L NS.  - CTX 2 g q24h until bacteremia can be excluded, can then deescalate to CTX 1 g  - f/u BCx, UCx sent 9/17 (admission)    # Anemia, normocytic  Hgb 11.4 but given thrombocytosis and other labs consistent with hemoconcentration, so expect Hgb to decrease in AM. MCV 84. Normal RDW. Unclear etiology; no s/sx bleeding; could be inflammatory milieu of pyelo decreasing EPO, but cannot exclude occult malignancy. While creatinine within normal range would consider possibility of an element of CKD or MAGGY contributing.   - f/u reticulocyte count, iron studies with AM labs  - referral for age-appropriate cancer screenings upon discharge    # T2DM with hyperglycemia, poorly controlled in spite of medication noncompliance  Glucose 552, improving markedly s/p 10 U IV insulin in ED. Given her A1c she most likely should be d/c on basal bolus insulin and is agreeable. Would continue metformin on d/c at appropriate dose (ideally 1000 mg BID); typically no other oral anti-diabetic agent is continued when insulin therapy is initiated except metformin. Metformin should only be discontinued in the event that the patient is determined to be a type 1 diabetic based on low C-peptide and/or positive auto-antibodies, or in patients with GFR <30. This patient has no element of renal dysfunction. There is no role for sitagliptin or dulaglutide if patient is d/c on appropriate insulin and metformin; additionally, dulaglutide is likely exacerbating her weight loss secondary to uncontrolled diabetes by inhibiting appetite via GLP-1 agonism.   - given history of both T2DM and stated T1DM in mother, will sent anti-SANTI, anti-islet, anti-insulin Ab  - f/u C-peptide to assess endogenous insulin secretory capacity  - Lantus 6 qhs given before patient's home dose Lantus known to be 25 u qhs, may need NPH 6 in the AM to bridge to Lantus 12 qhs tomorrow night  - start lispro 4 u TID AC tomorrow  - MDSSI  - diabetes education consult in AM  - endocrine consult in AM, patient would like to establish with an outpatient endocrinologist to better control her T2DM    # Metabolic alkalosis, hypochloremic  Consistent with vomiting (loss stomach HCl causes loss of chloride anion, and loss of H+ causes accumulation of HCO3) mixed with contraction alkalosis of hypovolemia.     # Severe protein-calorie malnutrition secondary to uncontrolled diabetes, needs consistent-CHO diet and insulin therapy  Weight loss most likely due to diabetes but cannot exclude underlying malignancy when weight loss is this severe, so needs referral for age-appropriate cancer screenings upon discharge.  - monitor phos and lytes closely for s/sx refeeding syndrome    # FEN  F: LR @ 80 x12 hours while still nauseous  E: replete generously in setting of up-titration of insulin, insulin will cause both K and Mg to move intracellularly, and refeeding can dramatically decrease phos as ATP is generated and inorganic phosphate is used up  N: consistent CHO with evening snack    # Need for prophylactic measure  DVT ppx: Lovenox, SCDs  GI: none indicated    Dispo: F    CODE STATUS: FULL CODE Mrs. Sidhu is a 54 yo F with PMH of T2DM, inadequately controlled on current regimen in spite of patient compliance, presenting with severe sepsis secondary to pyelonephritis likely due to gross glucosuria, now treated with 2g Ceftriaxone qd.    # Severe sepsis secondary to pyelonephritis with emphysema of the bladder in absence of instrumentation    # Anemia, normocytic  Hgb 11.4 but given thrombocytosis and other labs consistent with hemoconcentration, so expect Hgb to decrease in AM. MCV 84. Normal RDW. Unclear etiology; no s/sx bleeding; could be inflammatory milieu of pyelo decreasing EPO, but cannot exclude occult malignancy. While creatinine within normal range would consider possibility of an element of CKD or MAGGY contributing.   - f/u reticulocyte count, iron studies with AM labs  - referral for age-appropriate cancer screenings upon discharge    # T2DM with hyperglycemia, poorly controlled in spite of medication noncompliance  Glucose 552, improving markedly s/p 10 U IV insulin in ED. Given her A1c she most likely should be d/c on basal bolus insulin and is agreeable. Would continue metformin on d/c at appropriate dose (ideally 1000 mg BID); typically no other oral anti-diabetic agent is continued when insulin therapy is initiated except metformin. Metformin should only be discontinued in the event that the patient is determined to be a type 1 diabetic based on low C-peptide and/or positive auto-antibodies, or in patients with GFR <30. This patient has no element of renal dysfunction. There is no role for sitagliptin or dulaglutide if patient is d/c on appropriate insulin and metformin; additionally, dulaglutide is likely exacerbating her weight loss secondary to uncontrolled diabetes by inhibiting appetite via GLP-1 agonism.   - given history of both T2DM and stated T1DM in mother, will sent anti-SANTI, anti-islet, anti-insulin Ab  - f/u C-peptide to assess endogenous insulin secretory capacity  - Lantus 6 qhs given before patient's home dose Lantus known to be 25 u qhs, may need NPH 6 in the AM to bridge to Lantus 12 qhs tomorrow night  - start lispro 4 u TID AC tomorrow  - MDSSI  - diabetes education consult in AM  - endocrine consult in AM, patient would like to establish with an outpatient endocrinologist to better control her T2DM    # Metabolic alkalosis, hypochloremic  Consistent with vomiting (loss stomach HCl causes loss of chloride anion, and loss of H+ causes accumulation of HCO3) mixed with contraction alkalosis of hypovolemia.     # Severe protein-calorie malnutrition secondary to uncontrolled diabetes, needs consistent-CHO diet and insulin therapy  Weight loss most likely due to diabetes but cannot exclude underlying malignancy when weight loss is this severe, so needs referral for age-appropriate cancer screenings upon discharge.  - monitor phos and lytes closely for s/sx refeeding syndrome    # FEN  F: LR @ 80 x12 hours while still nauseous  E: replete generously in setting of up-titration of insulin, insulin will cause both K and Mg to move intracellularly, and refeeding can dramatically decrease phos as ATP is generated and inorganic phosphate is used up  N: consistent CHO with evening snack    # Need for prophylactic measure  DVT ppx: Lovenox, SCDs  GI: none indicated    Dispo: RMF    CODE STATUS: FULL CODE Mrs. Sidhu is a 54 yo F with PMH of T2DM, inadequately controlled on current regimen in spite of patient compliance, presenting with severe sepsis secondary to pyelonephritis likely due to gross glucosuria, now treated with 2g Ceftriaxone qd.

## 2020-09-18 NOTE — DISCHARGE NOTE PROVIDER - PROVIDER TOKENS
PROVIDER:[TOKEN:[[id number]],FOLLOWUP:[2 weeks]] FREE:[LAST:[Deb],FIRST:[Eve],PHONE:[phone]],FAX:[phone]],ADDRESS:[ADULT CARE NURSE PRACTITIONER  [address] [place] [address], [place] [address]],SCHEDULEDAPPT:[10/05/2020],SCHEDULEDAPPTTIME:[11:30 AM]]

## 2020-09-18 NOTE — DISCHARGE NOTE PROVIDER - HOSPITAL COURSE
#Discharge: please do not delete    Patient is 55F w/ PMH uncontrolled DM (on 3-drug regimen + insulin) presenting with 10d vomiting, anorexia, R-sided flank pain, found to meet severe sepsis criteria and admitted for treatment of pyelonephritis.     Problem List (diagnosis-based)    Sepsis:     Pyelonephritis:    DM w/ hyperglycemia:     Anemia:     Malnutrition:    Hospital course: Patient presented to the ED with 10d vomiting, anorexia, R sided flank pain. Also endorsed urinary frequency and polyuria. Found to have R-sided pyelonephritis on CT A/P and started on 2g Ceftriaxone qd. Patient met severe sepsis criteria with lactate 2.9 that cleared to 1.3 with administration of fluids. Patient was hyperglycemic to 550s and was given 10U regular IV insulin in the ED, then started on in-hospital regimen of 6U Lantus and 4U Lispro for pre-meal coverage, with sugars subsequently ranging from high 100s-200s. Diabetes education consulted and saw patient; she was given new glocometer for monitoring sugar at home. Endocrine consulted to optimize medication and insulin and to establish outpatient follow-up. Patient to continue Cefpodoxime PO to complete 10-14d course of antibiotics for treatment of pyelonephritis.     Labs to be followed outpatient:   Exam to be followed outpatient:   New medications:      #Discharge: please do not delete    Patient is 55F w/ PMH uncontrolled DM (on 3-drug regimen + insulin) presenting with 10d vomiting, anorexia, R-sided flank pain, found to meet severe sepsis criteria and admitted for treatment of pyelonephritis.     Problem List (diagnosis-based)    Sepsis: Patient met severe sepsis criteria on admission, meeting 3/4 SIRS criteria (WBC 18, febrile to 101.4, tachycardia to 118), lactate 2.9 and pyelonephritis confirmed by CT as likely source. Patient started on 2g Ceftriaxone qd, and given 2L NS for fluid resuscitation, which cleared lactate to 1.3. Patient clinically improved on Ceftriaxone, no longer meeting sepsis criteria. She is afebrile, stable for discharge on Cefpodoxime 500mg q6h to complete 10-14d course.     Pyelonephritis: Patient presented with 10d R flank pain, confirmed to have pyelonephritis by CT A/P. Treatment for pyelo as per above.     DM w/ hyperglycemia: Patient found to be hyperglycemic to 550s on presentation to ED. She was given     Anemia:     Malnutrition:    Hospital course: Patient presented to the ED with 10d vomiting, anorexia, R sided flank pain. Also endorsed urinary frequency and polyuria. Found to have R-sided pyelonephritis on CT A/P and started on 2g Ceftriaxone qd. Patient met severe sepsis criteria with lactate 2.9 that cleared to 1.3 with administration of fluids. Patient was hyperglycemic to 550s and was given 10U regular IV insulin in the ED, then started on in-hospital regimen of 6U Lantus and 4U Lispro for pre-meal coverage, with sugars subsequently ranging from high 100s-200s. Diabetes education consulted and saw patient; she was given new glocometer for monitoring sugar at home. Endocrine consulted to optimize medication and insulin and to establish outpatient follow-up. Patient to continue Cefpodoxime PO to complete 10-14d course of antibiotics for treatment of pyelonephritis.     Labs to be followed outpatient:   Exam to be followed outpatient:   New medications:      #Discharge: please do not delete    Patient is 55F w/ PMH uncontrolled DM (on 3-drug regimen + insulin) presenting with 10d vomiting, anorexia, R-sided flank pain, found to meet severe sepsis criteria and admitted for treatment of pyelonephritis.     Problem List (diagnosis-based)    Sepsis: Patient met severe sepsis criteria on admission, meeting 3/4 SIRS criteria (WBC 18, febrile to 101.4, tachycardia to 118), lactate 2.9 and pyelonephritis confirmed by CT as likely source. Patient started on 2g Ceftriaxone qd, and given 2L NS for fluid resuscitation, which cleared lactate to 1.3. Patient clinically improved on Ceftriaxone, no longer meeting sepsis criteria. She is afebrile, stable for discharge on Cefpodoxime 500mg q6h to complete 10-14d course.     Pyelonephritis: Patient presented with 10d R flank pain, confirmed to have pyelonephritis by CT A/P. Treatment for pyelo as per above.     DM w/ hyperglycemia: Patient found to be hyperglycemic to 550s on presentation to ED. She was given 10units of regular IV insulin, with improvement of sugars to 256. Patient then started on 6units of Lantus and 4units Lispro TID for premeal coverage. Glucose ranged from 117-200 while patient was hospitalized. Diabetes education was consulted to teach patient about controlling glucose at home and how to use a new glucometer. Endocrine consulted to optimize medication regimen and to establish outpatient follow-up.    Anemia: Patient found to be anemic with Hemoglobin 11.4, fits anemia of chronic disease picture with low total iron and elevated ferritin.     Malnutrition: Patient malnourished secondary to decreased PO intake and in setting of uncontrolled diabetes. Patient was monitored for refeeding syndrome and had electrolytes WNL - she did not require repletion.     Hospital course: Patient presented to the ED with 10d vomiting, anorexia, R sided flank pain. Also endorsed urinary frequency and polyuria. Found to have R-sided pyelonephritis on CT A/P and started on 2g Ceftriaxone qd. Patient met severe sepsis criteria with lactate 2.9 that cleared to 1.3 with administration of fluids. Patient was hyperglycemic to 550s and was given 10U regular IV insulin in the ED, then started on in-hospital regimen of 6U Lantus and 4U Lispro for pre-meal coverage, with sugars subsequently ranging from high 100s-200s. Diabetes education consulted and saw patient; she was given new glocometer for monitoring sugar at home. Endocrine consulted to optimize medication and insulin and to establish outpatient follow-up. Patient to continue Cefpodoxime PO to complete 10-14d course of antibiotics for treatment of pyelonephritis.     Labs to be followed outpatient: C-peptide, anti-SANTI, anti-islet, anti-insulin Ab   Exam to be followed outpatient: none  New medications:      #Discharge: please do not delete    Patient is 55F w/ PMH uncontrolled DM (on 3-drug regimen + insulin) presenting with 10d vomiting, anorexia, R-sided flank pain, found to meet severe sepsis criteria and admitted for treatment of pyelonephritis.     Problem List (diagnosis-based)    Sepsis: Patient met severe sepsis criteria on admission, meeting 3/4 SIRS criteria (WBC 18, febrile to 101.4, tachycardia to 118), lactate 2.9 and pyelonephritis confirmed by CT as likely source. Patient started on 2g Ceftriaxone qd. Patient clinically improved on Ceftriaxone, no longer meeting sepsis criteria. She is afebrile, stable for discharge on Cefpodoxime 500mg q6h to complete 10 day course.    Pyelonephritis: Patient presented with 10d R flank pain, confirmed to have pyelonephritis by CT A/P. Treatment for pyelo as per above.     DM w/ hyperglycemia: Patient found to be hyperglycemic to 550s on presentation to ED. She was given 10units of regular IV insulin, with improvement of sugars to 256. Patient then started on 6units of Lantus and 4units Lispro TID for premeal coverage. Glucose ranged from 117-200 while patient was hospitalized. Diabetes education was consulted to teach patient about controlling glucose at home and how to use a new glucometer. Endocrine consulted to optimize medication regimen and to establish outpatient follow-up.    Anemia: Patient found to be anemic with Hemoglobin 11.4, fits anemia of chronic disease picture with low total iron and elevated ferritin.     Malnutrition: Patient malnourished secondary to decreased PO intake and in setting of uncontrolled diabetes. Patient was monitored for refeeding syndrome and had electrolytes WNL - she did not require repletion.     Hospital course: Patient presented to the ED with 10d vomiting, anorexia, R sided flank pain. Also endorsed urinary frequency and polyuria. Found to have R-sided pyelonephritis on CT A/P and started on 2g Ceftriaxone qd. Patient met severe sepsis criteria with lactate 2.9 that cleared to 1.3 with administration of fluids. Patient was hyperglycemic to 550s and was given 10U regular IV insulin in the ED, then started on in-hospital regimen of 6U Lantus and 4U Lispro for pre-meal coverage, with sugars subsequently ranging from high 100s-200s. Diabetes education consulted and saw patient; she was given new glocometer for monitoring sugar at home. Endocrine consulted to optimize medication and insulin and to establish outpatient follow-up. Patient to continue Cefpodoxime PO to complete 10d course of antibiotics for treatment of pyelonephritis.     Labs to be followed outpatient: A1C  Exam to be followed outpatient: none  New medications: Cefpodoxime     Patient is 55F w/ PMH uncontrolled DM (on 3-drug regimen + insulin) presenting with 10d vomiting, anorexia, R-sided flank pain, found to meet severe sepsis criteria and admitted for treatment of pyelonephritis.     Problem List (diagnosis-based)    Sepsis: Patient met severe sepsis criteria on admission, meeting 3/4 SIRS criteria (WBC 18, febrile to 101.4, tachycardia to 118), lactate 2.9 and pyelonephritis confirmed by CT as likely source. Patient started on 2g Ceftriaxone qd. Patient clinically improved on Ceftriaxone, no longer meeting sepsis criteria. She is afebrile, stable for discharge on Cefpodoxime 500mg q6h to complete 10 day course.    Pyelonephritis: Patient presented with 10d R flank pain, confirmed to have pyelonephritis by CT A/P. Treatment for pyelo as per above.     DM w/ hyperglycemia: Patient found to be hyperglycemic to 550s on presentation to ED. She was given 10units of regular IV insulin, with improvement of sugars to 256. Patient then started on 8units of Lantus and 2units Lispro TID for premeal coverage per endo. Glucose ranged from 117-200 while patient was hospitalized. Diabetes education was consulted to teach patient about controlling glucose at home and how to use a new glucometer. Endocrine consulted to optimize medication regimen and to establish outpatient follow-up.    Anemia: Patient found to be anemic with Hemoglobin 11.4, fits anemia of chronic disease picture with low total iron and elevated ferritin.     Malnutrition: Patient malnourished secondary to decreased PO intake and in setting of uncontrolled diabetes. Patient was monitored for refeeding syndrome and had electrolytes WNL - she did not require repletion.     Hospital course: Patient presented to the ED with 10d vomiting, anorexia, R sided flank pain. Also endorsed urinary frequency and polyuria. Found to have R-sided pyelonephritis on CT A/P and started on 2g Ceftriaxone qd. Patient met severe sepsis criteria with lactate 2.9 that cleared to 1.3 with administration of fluids. Patient was hyperglycemic to 550s and was given 10U regular IV insulin in the ED, then started on in-hospital regimen of 6U Lantus and 4U Lispro for pre-meal coverage, with sugars subsequently ranging from high 100s-200s. Diabetes education consulted and saw patient; she was given new glocometer for monitoring sugar at home. Endocrine consulted to optimize medication and insulin and to establish outpatient follow-up. Patient to continue Cefpodoxime PO to complete 10d course of antibiotics for treatment of pyelonephritis.     Labs to be followed outpatient: A1C  Exam to be followed outpatient: none  New medications: Cefpodoxime 500mg q6h until 9/27/20, Lispro 2Units TID before meals.     Patient is 55F w/ PMH uncontrolled DM (on 3-drug regimen + insulin) presenting with 10d vomiting, anorexia, R-sided flank pain, found to meet severe sepsis criteria and admitted for treatment of pyelonephritis.     Problem List (diagnosis-based)    Sepsis: Patient met severe sepsis criteria on admission, meeting 3/4 SIRS criteria (WBC 18, febrile to 101.4, tachycardia to 118), lactate 2.9 and pyelonephritis confirmed by CT as likely source. Patient started on 2g Ceftriaxone qd. Patient clinically improved on Ceftriaxone, no longer meeting sepsis criteria. She is afebrile, stable for discharge on Cefpodoxime 500mg q6h to complete 10 day course.    Pyelonephritis: Patient presented with 10d R flank pain, confirmed to have pyelonephritis by CT A/P. Treatment for pyelo as per above.     DM w/ hyperglycemia: Patient found to be hyperglycemic to 550s on presentation to ED. She was given 10units of regular IV insulin, with improvement of sugars to 256. Patient then started on 8units of Lantus and 2units Lispro TID for premeal coverage per endo. Glucose ranged from 117-200 while patient was hospitalized. Diabetes education was consulted to teach patient about controlling glucose at home and how to use a new glucometer. Endocrine consulted to optimize medication regimen and to establish outpatient follow-up.    Anemia: Patient found to be anemic with Hemoglobin 11.4, fits anemia of chronic disease picture with low total iron and elevated ferritin.     Malnutrition: Patient malnourished secondary to decreased PO intake and in setting of uncontrolled diabetes. Patient was monitored for refeeding syndrome and had electrolytes WNL - she did not require repletion.     Hospital course: Patient presented to the ED with 10d vomiting, anorexia, R sided flank pain. Also endorsed urinary frequency and polyuria. Found to have R-sided pyelonephritis on CT A/P and started on 2g Ceftriaxone qd. Patient met severe sepsis criteria with lactate 2.9 that cleared to 1.3 with administration of fluids. Patient was hyperglycemic to 550s and was given 10U regular IV insulin in the ED, then started on in-hospital regimen of 6U Lantus and 4U Lispro for pre-meal coverage, with sugars subsequently ranging from high 100s-200s. Diabetes education consulted and saw patient; she was given new glocometer for monitoring sugar at home. Endocrine consulted to optimize medication and insulin and to establish outpatient follow-up. Patient to continue Cefpodoxime PO to complete 10d course of antibiotics for treatment of pyelonephritis.     Labs to be followed outpatient: A1C  Exam to be followed outpatient: none  New medications: Cefpodoxime 500mg q6h until 9/27/20, Lantus 10U     Patient is 55F w/ PMH uncontrolled DM (on 3-drug regimen + insulin) presenting with 10d vomiting, anorexia, R-sided flank pain, found to meet severe sepsis criteria and admitted for treatment of pyelonephritis.     Problem List (diagnosis-based)    Sepsis: Patient met severe sepsis criteria on admission, meeting 3/4 SIRS criteria (WBC 18, febrile to 101.4, tachycardia to 118), lactate 2.9 and pyelonephritis confirmed by CT as likely source. Patient started on 2g Ceftriaxone qd. Patient clinically improved on Ceftriaxone, no longer meeting sepsis criteria. She is afebrile, stable for discharge on Cefpodoxime 500mg q6h to complete 10 day course.    Pyelonephritis: Patient presented with 10d R flank pain, confirmed to have pyelonephritis by CT A/P. Treatment for pyelo as per above.     DM w/ hyperglycemia: Patient found to be hyperglycemic to 550s on presentation to ED. She was given 10units of regular IV insulin, with improvement of sugars to 256. Patient then started on 8units of Lantus and 2units Lispro TID for premeal coverage per endo. Glucose ranged from 117-200 while patient was hospitalized. Diabetes education was consulted to teach patient about controlling glucose at home and how to use a new glucometer. Endocrine consulted to optimize medication regimen and to establish outpatient follow-up.    Anemia: Patient found to be anemic with Hemoglobin 11.4, fits anemia of chronic disease picture with low total iron and elevated ferritin.     Malnutrition: Patient malnourished secondary to decreased PO intake and in setting of uncontrolled diabetes. Patient was monitored for refeeding syndrome and had electrolytes WNL - she did not require repletion.     Hospital course: Patient presented to the ED with 10d vomiting, anorexia, R sided flank pain. Also endorsed urinary frequency and polyuria. Found to have R-sided pyelonephritis on CT A/P and started on 2g Ceftriaxone qd. Patient met severe sepsis criteria with lactate 2.9 that cleared to 1.3 with administration of fluids. Patient was hyperglycemic to 550s and was given 10U regular IV insulin in the ED, then started on in-hospital regimen of 6U Lantus and 4U Lispro for pre-meal coverage, with sugars subsequently ranging from high 100s-200s. Diabetes education consulted and saw patient; she was given new glocometer for monitoring sugar at home. Endocrine consulted to optimize medication and insulin and to establish outpatient follow-up. Patient to continue Cefpodoxime PO to complete 10d course of antibiotics for treatment of pyelonephritis.     Labs to be followed outpatient: A1C and E.coli sensitivity   Exam to be followed outpatient: none  New medications: Cefpodoxime 500mg q6h until 9/27/20, Lantus 10U

## 2020-09-18 NOTE — PROGRESS NOTE ADULT - SUBJECTIVE AND OBJECTIVE BOX
NOTE IN PROGRESS INCOMPLETE    OVERNIGHT EVENTS:    SUBJECTIVE / INTERVAL HPI: Patient seen and examined at bedside. No fevers/chills, nausea, vomiting, diarrhea, abdominal pain, chest pain, shortness of breath.      VITAL SIGNS:  Vital Signs Last 24 Hrs  T(C): 37.1 (18 Sep 2020 01:27), Max: 38.6 (17 Sep 2020 18:29)  T(F): 98.8 (18 Sep 2020 01:27), Max: 101.4 (17 Sep 2020 18:29)  HR: 111 (17 Sep 2020 23:01) (110 - 124)  BP: 108/68 (17 Sep 2020 23:01) (108/68 - 167/93)  BP(mean): --  RR: 18 (17 Sep 2020 23:01) (18 - 18)  SpO2: 98% (17 Sep 2020 23:01) (97% - 100%)    PHYSICAL EXAM:    General: WDWN  HEENT: NC/AT; PERRL, anicteric sclera; MMM  Neck: supple  Cardiovascular: +S1/S2; RRR  Respiratory: CTA B/L; no W/R/R  Gastrointestinal: soft, NT/ND; +BSx4  Extremities: WWP; no edema, clubbing or cyanosis  Vascular: 2+ radial, DP/PT pulses B/L  Neurological: AAOx3; no focal deficits    MEDICATIONS:  MEDICATIONS  (STANDING):  cefTRIAXone Injectable. 2000 milliGRAM(s) IV Push every 24 hours  dextrose 5%. 1000 milliLiter(s) (50 mL/Hr) IV Continuous <Continuous>  dextrose 50% Injectable 12.5 Gram(s) IV Push once  dextrose 50% Injectable 25 Gram(s) IV Push once  dextrose 50% Injectable 25 Gram(s) IV Push once  influenza   Vaccine 0.5 milliLiter(s) IntraMuscular once  insulin glargine Injectable (LANTUS) 12 Unit(s) SubCutaneous at bedtime  insulin lispro (HumaLOG) corrective regimen sliding scale   SubCutaneous three times a day before meals  insulin lispro (HumaLOG) corrective regimen sliding scale   SubCutaneous at bedtime  insulin lispro Injectable (HumaLOG) 4 Unit(s) SubCutaneous three times a day before meals  lactated ringers. 1000 milliLiter(s) (80 mL/Hr) IV Continuous <Continuous>  polyethylene glycol 3350 17 Gram(s) Oral two times a day  senna 2 Tablet(s) Oral at bedtime    MEDICATIONS  (PRN):  acetaminophen   Tablet .. 650 milliGRAM(s) Oral every 6 hours PRN Mild Pain (1 - 3)  calcium carbonate    500 mG (Tums) Chewable 3 Tablet(s) Chew every 6 hours PRN Dyspepsia  dextrose 40% Gel 15 Gram(s) Oral once PRN Blood Glucose LESS THAN 70 milliGRAM(s)/deciliter  glucagon  Injectable 1 milliGRAM(s) IntraMuscular once PRN Glucose LESS THAN 70 milligrams/deciliter  melatonin 5 milliGRAM(s) Oral at bedtime PRN Sleep  ondansetron Injectable 4 milliGRAM(s) IV Push once PRN Nausea and/or Vomiting  oxyCODONE    IR 5 milliGRAM(s) Oral every 4 hours PRN Moderate Pain (4 - 6)      ALLERGIES:  Allergies    No Known Allergies    Intolerances        LABS:                        11.4   18.09 )-----------( 470      ( 17 Sep 2020 15:27 )             34.5     09-17    131<L>  |  86<L>  |  13  ----------------------------<  507<HH>  3.9   |  30  |  0.75    Ca    9.7      17 Sep 2020 15:27  Mg     2.0     09-17    TPro  8.5<H>  /  Alb  3.5  /  TBili  0.5  /  DBili  x   /  AST  15  /  ALT  13  /  AlkPhos  111  09-17    PT/INR - ( 17 Sep 2020 15:27 )   PT: 14.7 sec;   INR: 1.24            Urinalysis Basic - ( 17 Sep 2020 15:50 )    Color: Yellow / Appearance: SL Cloudy / SG: <=1.005 / pH: x  Gluc: x / Ketone: 15 mg/dL  / Bili: Negative / Urobili: 1.0 E.U./dL   Blood: x / Protein: Trace mg/dL / Nitrite: NEGATIVE   Leuk Esterase: NEGATIVE / RBC: < 5 /HPF / WBC Many /HPF   Sq Epi: x / Non Sq Epi: 5-10 /HPF / Bacteria: Present /HPF      CAPILLARY BLOOD GLUCOSE      POCT Blood Glucose.: 272 mg/dL (17 Sep 2020 23:44)      RADIOLOGY & ADDITIONAL TESTS: Reviewed.       SUBJECTIVE / INTERVAL HPI: Patient seen and examined at bedside. Endorses severe, non-radiating R flank pain and pt is requesting medication for pain. No fevers/chills, nausea, vomiting, diarrhea, abdominal pain, chest pain, shortness of breath. Denies dysuria.       VITAL SIGNS:  Vital Signs Last 24 Hrs  T(C): 37.1 (18 Sep 2020 01:27), Max: 38.6 (17 Sep 2020 18:29)  T(F): 98.8 (18 Sep 2020 01:27), Max: 101.4 (17 Sep 2020 18:29)  HR: 111 (17 Sep 2020 23:01) (110 - 124)  BP: 108/68 (17 Sep 2020 23:01) (108/68 - 167/93)  BP(mean): --  RR: 18 (17 Sep 2020 23:01) (18 - 18)  SpO2: 98% (17 Sep 2020 23:01) (97% - 100%)    PHYSICAL EXAM:    General: WDWN  HEENT: NC/AT; PERRL, anicteric sclera; MMM  Neck: supple, trachea midline  Cardiovascular: +S1/S2; RRR  Respiratory: CTA B/L; no W/R/R  Gastrointestinal: soft, NT/ND; +BSx4, + R CVAT, no L CVAD, no suprapubic fullness or pain  Extremities: WWP; no edema, clubbing or cyanosis; strength 5/5, sensation intact in b/l UEs and LEs  Vascular: 2+ radial, DP/PT pulses B/L  Neurological: AAOx3; no focal deficits    MEDICATIONS:  MEDICATIONS  (STANDING):  cefTRIAXone Injectable. 2000 milliGRAM(s) IV Push every 24 hours  dextrose 5%. 1000 milliLiter(s) (50 mL/Hr) IV Continuous <Continuous>  dextrose 50% Injectable 12.5 Gram(s) IV Push once  dextrose 50% Injectable 25 Gram(s) IV Push once  dextrose 50% Injectable 25 Gram(s) IV Push once  influenza   Vaccine 0.5 milliLiter(s) IntraMuscular once  insulin glargine Injectable (LANTUS) 12 Unit(s) SubCutaneous at bedtime  insulin lispro (HumaLOG) corrective regimen sliding scale   SubCutaneous three times a day before meals  insulin lispro (HumaLOG) corrective regimen sliding scale   SubCutaneous at bedtime  insulin lispro Injectable (HumaLOG) 4 Unit(s) SubCutaneous three times a day before meals  lactated ringers. 1000 milliLiter(s) (80 mL/Hr) IV Continuous <Continuous>  polyethylene glycol 3350 17 Gram(s) Oral two times a day  senna 2 Tablet(s) Oral at bedtime    MEDICATIONS  (PRN):  acetaminophen   Tablet .. 650 milliGRAM(s) Oral every 6 hours PRN Mild Pain (1 - 3)  calcium carbonate    500 mG (Tums) Chewable 3 Tablet(s) Chew every 6 hours PRN Dyspepsia  dextrose 40% Gel 15 Gram(s) Oral once PRN Blood Glucose LESS THAN 70 milliGRAM(s)/deciliter  glucagon  Injectable 1 milliGRAM(s) IntraMuscular once PRN Glucose LESS THAN 70 milligrams/deciliter  melatonin 5 milliGRAM(s) Oral at bedtime PRN Sleep  ondansetron Injectable 4 milliGRAM(s) IV Push once PRN Nausea and/or Vomiting  oxyCODONE    IR 5 milliGRAM(s) Oral every 4 hours PRN Moderate Pain (4 - 6)      ALLERGIES:  Allergies    No Known Allergies    Intolerances        LABS:                        11.4   18.09 )-----------( 470      ( 17 Sep 2020 15:27 )             34.5     09-17    131<L>  |  86<L>  |  13  ----------------------------<  507<HH>  3.9   |  30  |  0.75    Ca    9.7      17 Sep 2020 15:27  Mg     2.0     09-17    TPro  8.5<H>  /  Alb  3.5  /  TBili  0.5  /  DBili  x   /  AST  15  /  ALT  13  /  AlkPhos  111  09-17    PT/INR - ( 17 Sep 2020 15:27 )   PT: 14.7 sec;   INR: 1.24            Urinalysis Basic - ( 17 Sep 2020 15:50 )    Color: Yellow / Appearance: SL Cloudy / SG: <=1.005 / pH: x  Gluc: x / Ketone: 15 mg/dL  / Bili: Negative / Urobili: 1.0 E.U./dL   Blood: x / Protein: Trace mg/dL / Nitrite: NEGATIVE   Leuk Esterase: NEGATIVE / RBC: < 5 /HPF / WBC Many /HPF   Sq Epi: x / Non Sq Epi: 5-10 /HPF / Bacteria: Present /HPF      CAPILLARY BLOOD GLUCOSE      POCT Blood Glucose.: 272 mg/dL (17 Sep 2020 23:44)      RADIOLOGY & ADDITIONAL TESTS: Reviewed.

## 2020-09-18 NOTE — CONSULT NOTE ADULT - SUBJECTIVE AND OBJECTIVE BOX
HPI:Mrs. Sidhu is a 54 yo F with PMH T2DM (insulin degludec, dulaglutide, sitagliptin, metformin, A1c 11% in spite of compliance), who presents with a 10-day history of nausea, vomiting and anorexia. She reports she has been trying everything her doctor has recommended to try to control her diabetes, but she still has had persistently high A1c. She does not test blood sugars at home and was never prescribed a meter or taught how to test, in spite of being more than willing to "try anything" to avoid complications of diabetes. Starting 10 days ago she started to have back pain on the R side, 10/10 in intensity, colicky, non-radiating. This was associated with anorexia, nausea and vomiting; daughter at bedside states "She hasn't eaten in 10 days." She affirms polydipsia, polyuria, and weight loss chronically over last several months. She denies dysuria or hematuria.     ED Vitals: 101.4 (rectal), 118, 145/86, 18, 98% on RA. WBC 18.1, Hgb 11.4, lactate 2.9, sodium nominally 131 (138 corrected for hyperglycemia), Cl 86, HCO3 30, BUN/Cr 13/.75, glucose 507. CT with R pyelo and air in lumen of urinary bladder attributed to possible air-producing infection. Received Tylenol 1g, CTX 1g, insulin regular 10 U IV, Zofran 4 mg, NS 2L.  - Start date : CTX 2 g q24h until bacteremia can be excluded, can then deescalate to CTX 1 g if WBC downtrend    FSG & insulin:    Dinner FSG   Lispro   +    Ate  Bedtime FSG     Lantus      and Lispro         Breakfast FSG   Lispro    +    Ate  Lunch FSG      Lispro    +    Ate      Age at Dx:  How dx:  Hx and duration of insulin:  Current Therapy:  Hx of other regimens:    Home FSG:  Fasting  Lunch  Dinner  Bed    Diet:  Exercise:    Hx of hypoglycemia:  Hx of DKA/HHS:  Complications:  Outpatient Endo:    FH: Family history of pancreatic cancer, Father,  age 69.  Family history of type 1 diabetes mellitus, Maternal, onset age 60  Family history of type 2 diabetes mellitus in brother  Family history of type 2 diabetes mellitus in sister.  DM:  Thyroid:  Autoimmune:  Other:    SH: Lives independently in the community. Daughter works in cath lab at [employer].  Smoking  Etoh:  Recreational Drugs:  Social Life:    PMH & Surgical Hx:ACUTE PYELONEPHRITISHYPERGLYCEMIA    Family history of type 1 diabetes mellitus    Family history of type 2 diabetes mellitus in brother    Family history of type 2 diabetes mellitus in sister    Family history of pancreatic cancer    Handoff    MEWS Score    T2DM (type 2 diabetes mellitus)    Acute pyelonephritis    Nutrition, metabolism, and development symptoms    Severe protein-calorie malnutrition    Metabolic alkalosis    Anemia    Type 2 diabetes mellitus with hyperglycemia, with long-term current use of insulin    Acute pyelonephritis    Severe sepsis    No significant past surgical history    VOMITING    Hyperglycemia    SysAdmin_VisitLink            Current Meds:  acetaminophen   Tablet .. 650 milliGRAM(s) Oral every 6 hours PRN  calcium carbonate    500 mG (Tums) Chewable 3 Tablet(s) Chew every 6 hours PRN  cefTRIAXone Injectable. 2000 milliGRAM(s) IV Push every 24 hours  dextrose 40% Gel 15 Gram(s) Oral once PRN  dextrose 5%. 1000 milliLiter(s) IV Continuous <Continuous>  dextrose 50% Injectable 12.5 Gram(s) IV Push once  dextrose 50% Injectable 25 Gram(s) IV Push once  dextrose 50% Injectable 25 Gram(s) IV Push once  enoxaparin Injectable 40 milliGRAM(s) SubCutaneous every 24 hours  glucagon  Injectable 1 milliGRAM(s) IntraMuscular once PRN  influenza   Vaccine 0.5 milliLiter(s) IntraMuscular once  insulin glargine Injectable (LANTUS) 12 Unit(s) SubCutaneous at bedtime  insulin lispro (HumaLOG) corrective regimen sliding scale   SubCutaneous three times a day before meals  insulin lispro (HumaLOG) corrective regimen sliding scale   SubCutaneous at bedtime  insulin lispro Injectable (HumaLOG) 4 Unit(s) SubCutaneous three times a day before meals  melatonin 5 milliGRAM(s) Oral at bedtime PRN  ondansetron Injectable 4 milliGRAM(s) IV Push once PRN  polyethylene glycol 3350 17 Gram(s) Oral two times a day  senna 2 Tablet(s) Oral at bedtime      Allergies:  No Known Allergies      ROS:  Denies the following except as indicated.    General: weight loss/weight gain, decreased appetite, fatigue  Eyes: Blurry vision, double vision, visual changes  ENT: Throat pain, changes in voice,   CV: palpitations, SOB, CP, cough  GI: NVD, difficulty swallowing, abdominal pain  : polyuria, dysuria  Endo: abnormal menses, temperature intolerance, decreased libido  MSK: weakness, joint pain  Skin: rash, dryness, diaphoresis  Heme: Easy bruising, bleeding  Neuro: HA, dizziness, lightheadedness, numbness/ tingling  Psych: Anxiety, Depression    Vital Signs Last 24 Hrs  T(C): 37.1 (18 Sep 2020 01:27), Max: 38.6 (17 Sep 2020 18:29)  T(F): 98.8 (18 Sep 2020 01:27), Max: 101.4 (17 Sep 2020 18:29)  HR: 111 (17 Sep 2020 23:) (110 - 124)  BP: 108/68 (17 Sep 2020 23:) (108/68 - 167/93)  BP(mean): --  RR: 18 (17 Sep 2020 23:) (18 - 18)  SpO2: 98% (17 Sep 2020 23:) (97% - 100%)  Height (cm): 149.9 ( @ 14:45)  Weight (kg): 45.4 ( @ 14:45)  BMI (kg/m2): 20.2 ( @ 14:45)      Constitutional: wn/wd in NAD.   HEENT: NCAT, MMM, OP clear, EOMI, , no proptosis or lid retraction  Neck: no thyromegaly or palpable thyroid nodules   Respiratory: lungs CTAB.  Cardiovascular: regular rhythm, normal S1 and S2, no audible murmurs, no peripheral edema  GI: soft, NT/ND, no masses/HSM appreciated.  Neurology: no tremors, DTR 2+  Skin: no visible rashes/lesions. no acanthosis nigricans. no hyperlipotrophy. no cushing's stigmata.  Psychiatric: AAO x 3, normal affect/mood.  Ext: radial pulses intact, DP pulses intact, extremities warm, no cyanosis, clubbing or edema.       LABS:                        10.6   18.20 )-----------( 419      ( 18 Sep 2020 07:04 )             32.9         135  |  95<L>  |  8   ----------------------------<  170<H>  3.6   |  29  |  0.74    Ca    8.8      18 Sep 2020 07:04  Phos  2.9     09-18  Mg     2.0     -18    TPro  8.5<H>  /  Alb  3.5  /  TBili  0.5  /  DBili  x   /  AST  15  /  ALT  13  /  AlkPhos  111  -    PT/INR - ( 17 Sep 2020 15:27 )   PT: 14.7 sec;   INR: 1.24            Urinalysis Basic - ( 17 Sep 2020 15:50 )    Color: Yellow / Appearance: SL Cloudy / SG: <=1.005 / pH: x  Gluc: x / Ketone: 15 mg/dL  / Bili: Negative / Urobili: 1.0 E.U./dL   Blood: x / Protein: Trace mg/dL / Nitrite: NEGATIVE   Leuk Esterase: NEGATIVE / RBC: < 5 /HPF / WBC Many /HPF   Sq Epi: x / Non Sq Epi: 5-10 /HPF / Bacteria: Present /HPF        Thyroid Stimulating Hormone, Serum: 1.426 (17 @ 15:27)      RADIOLOGY & ADDITIONAL STUDIES:  CAPILLARY BLOOD GLUCOSE      POCT Blood Glucose.: 175 mg/dL (18 Sep 2020 08:24)  POCT Blood Glucose.: 272 mg/dL (17 Sep 2020 23:44)  POCT Blood Glucose.: 117 mg/dL (17 Sep 2020 19:30)  POCT Blood Glucose.: 136 mg/dL (17 Sep 2020 19:08)  POCT Blood Glucose.: 157 mg/dL (17 Sep 2020 18:41)  POCT Blood Glucose.: 146 mg/dL (17 Sep 2020 18:06)  POCT Blood Glucose.: 256 mg/dL (17 Sep 2020 17:19)  POCT Blood Glucose.: 552 mg/dL (17 Sep 2020 14:51)      Will discuss in rounds  A/P:Mrs. Sidhu is a 54 yo F with PMH of T2DM, inadequately controlled on current regimen in spite of patient compliance, presenting with severe sepsis secondary to pyelonephritis likely due to gross glucosuria, now treated with 2g Ceftriaxone qd.        1.  DM -   A1C:  Weight:  Cr/GRF:  ER:    Please continue lantus       units at night / morning.  Please continue lispro      units before each meal.  Please continue lispro moderate / low dose sliding scale four times daily with meals and at bedtime    Pt's fingerstick glucose goal is     Will continue to monitor     For discharge, pt can continue    Pt can follow up at discharge with [facility] by calling  to make an appointment.   Will discuss case with     and update primary team    To Make an appointment at [address] [place] [address] for the patient, either:  1. Send a STAT task via [employer] to Verona Salazar or Claudia Jauregui (office managers)   OR  2. Call supervisor's line. P: [phone] (do not give this number to patient). VM is checked periodically.  In the message, specify that this is a hospital discharge follow-up, and that the appt can be made with a NP if there is no timely MD availability.    REMINDERS FOR INSULIN/DIABETES SUPPLIES at DISCHARGE:  INSULIN:   Long actin/Basal Insulin: Examples: Toujeo, Basaglar, Tresiba, Lantus   Short acting/Bolus Insulin: Humalog, Admelog, Novolog  Please ensure that BOTH short acting and long acting insulin are prescribed in the same preparation (Ex: PEN vs VIAL/SOLUTION)     TESTING SUPPLIES:   All glucometer supplies should be written as generic to avoid issues with insurance. Use the free text option in sunrise prescription writer, and type in glucometer test strips, lancets, etc to order.    If sending patient home on insulin PEN, please send:   •	BD elsy insulin pen needles for use up to 4 times daily (total quantity 100)  •	Lancets for use up to 4 times daily (total quantity 100)  •	Glucometer Test strips for use up to 4 times daily (total quantity 100)  •	Alcohol swabs for use up to 4 times daily (total quantity 100)  •	Glucometer (If provided by hospital, still provide scripts for lancets, test strips, and swabs)  If sending patient home on insulin VIAL, please send:   •	Insulin syringes (6mm) - for use up to 4 times daily (total quantity 100)  •	Lancets for use up to 4 times daily (total quantity 100)  •	Generic Glucometer Test strips for use up to 4 times daily (total quantity 100)  •	Alcohol swabs for use up to 4 times daily (total quantity 100)  •	Generic Glucometer (If provided by hospital, still provide scripts for lancets, test strips, and swabs)  •	Do not specify brand for testing supplies (such as contour, freestyle, one touch etc) that way the pharmacy has the freedom to pick and change according to what the insurance dictates.  For patients without insurance:   •	Provide social work with appropriate scripts so they may obtain 1 week of samples  •	Provide with glucometer. Glucometers are located at various nursing stations, the nursing office, education, and endocrine fellows office.  •	Please make appointment with Ericka Joel NP or Fay Burgess RN and SUDHAKAR De Leon at the [address] [place] [address] endocrinology clinic. They can see patients without insurance, provide appropriate samples, and assist in getting insurance coverage.     PREFERRED PHARMACY:  [facility] (located on 1st floor next to admitting)  P: [phone]  Hours: M – F 8AM – 8PM, Sat 8AM – 4PM, Sun—closed  If not using [employer], please follow up with chosen pharmacy to ensure insulin prescribed is covered.        HPI:Mrs. Sidhu is a 54 yo F with PMH T2DM (insulin tresiba, trulicity, januvia, metformin, A1c 11% in spite of reported compliance), who presents with a 10-day history of nausea, vomiting and anorexia. Starting 10 days ago she started to have back pain on the R side, 10/10 in intensity, colicky, non-radiating. This was associated with anorexia, nausea and vomiting; daughter at bedside states "She hasn't eaten in 10 days." She affirms polydipsia, polyuria, and weight loss chronically over last several months. She denies dysuria or hematuria.   ED Vitals: 101.4 (rectal), 118, 145/86, 18, 98% on RA. WBC 18.1, Hgb 11.4, lactate 2.9, sodium nominally 131 (138 corrected for hyperglycemia), Cl 86, HCO3 30, BUN/Cr 13/.75, glucose 507. CTA showed right sided pyelonephritis, hepatic steatosis (LFTs normal), cholelithiasis, and mild spine degeneration. Pancreas and adrenal glands normal. Received Tylenol 1g, CTX 1g, insulin regular 10 U IV, Zofran 4 mg, NS 2L.  - Start date : CTX 2 g q24h until bacteremia can be excluded, can then deescalate to CTX 1 g if WBC downtrend.    This AM temp 100.7 with . WBC 18. She is complaining of pain and declines interview at this time. Says someone (Jacqueline) already spoke to her about DM. Jacqueline also placed freestyle elsy.    She reports she has been trying everything her doctor has recommended to try to control her diabetes, but she still has had persistently high A1c. She does not test blood sugars at home and was never prescribed a meter or taught how to test, in spite of being more than willing to "try anything" to avoid complications of diabetes. Her PCP prescribed elsy, which she never put on. She has not been taking medication since she has been sick.     FSG & insulin:  :  2PM   5P  Regular insulin 10 units IVP  6P   7P   Bedtime . Lantus 6 and lispro 1  :  Breakfast    Lispro  4  +  2  Ate  Lunch FSG  175    Lispro    +    Ate      Age at Dx:  How dx:  Hx and duration of insulin:  Current Therapy:  Hx of other regimens:    Home FSG:  Fasting  Lunch  Dinner  Bed    Diet:  Exercise:    Hx of hypoglycemia:  Hx of DKA/HHS:  Complications:  Outpatient Endo:    FH: Family history of pancreatic cancer, Father,  age 69.  Family history of type 1 diabetes mellitus, Maternal, onset age 60  Family history of type 2 diabetes mellitus in brother  Family history of type 2 diabetes mellitus in sister.    SH: Lives independently in the community. Daughter works in cath lab at [employer].  Smoking  Etoh:  Recreational Drugs:  Social Life:    PMH & Surgical Hx:ACUTE PYELONEPHRITISHYPERGLYCEMIA    Family history of type 1 diabetes mellitus    Family history of type 2 diabetes mellitus in brother    Family history of type 2 diabetes mellitus in sister    Family history of pancreatic cancer    Handoff    MEWS Score    T2DM (type 2 diabetes mellitus)    Acute pyelonephritis    Nutrition, metabolism, and development symptoms    Severe protein-calorie malnutrition    Metabolic alkalosis    Anemia    Type 2 diabetes mellitus with hyperglycemia, with long-term current use of insulin    Acute pyelonephritis    Severe sepsis    No significant past surgical history    VOMITING    Hyperglycemia    SysAdmin_VisitLink            Current Meds:  acetaminophen   Tablet .. 650 milliGRAM(s) Oral every 6 hours PRN  calcium carbonate    500 mG (Tums) Chewable 3 Tablet(s) Chew every 6 hours PRN  cefTRIAXone Injectable. 2000 milliGRAM(s) IV Push every 24 hours  dextrose 40% Gel 15 Gram(s) Oral once PRN  dextrose 5%. 1000 milliLiter(s) IV Continuous <Continuous>  dextrose 50% Injectable 12.5 Gram(s) IV Push once  dextrose 50% Injectable 25 Gram(s) IV Push once  dextrose 50% Injectable 25 Gram(s) IV Push once  enoxaparin Injectable 40 milliGRAM(s) SubCutaneous every 24 hours  glucagon  Injectable 1 milliGRAM(s) IntraMuscular once PRN  influenza   Vaccine 0.5 milliLiter(s) IntraMuscular once  insulin glargine Injectable (LANTUS) 12 Unit(s) SubCutaneous at bedtime  insulin lispro (HumaLOG) corrective regimen sliding scale   SubCutaneous three times a day before meals  insulin lispro (HumaLOG) corrective regimen sliding scale   SubCutaneous at bedtime  insulin lispro Injectable (HumaLOG) 4 Unit(s) SubCutaneous three times a day before meals  melatonin 5 milliGRAM(s) Oral at bedtime PRN  ondansetron Injectable 4 milliGRAM(s) IV Push once PRN  polyethylene glycol 3350 17 Gram(s) Oral two times a day  senna 2 Tablet(s) Oral at bedtime      Allergies:  No Known Allergies      ROS:  Denies the following except as indicated.    General: weight loss/weight gain, decreased appetite, fatigue  Eyes: Blurry vision, double vision, visual changes  ENT: Throat pain, changes in voice,   CV: palpitations, SOB, CP, cough  GI: NVD, difficulty swallowing, abdominal pain  : polyuria, dysuria  Endo: abnormal menses, temperature intolerance, decreased libido  MSK: weakness, joint pain  Skin: rash, dryness, diaphoresis  Heme: Easy bruising, bleeding  Neuro: HA, dizziness, lightheadedness, numbness/ tingling  Psych: Anxiety, Depression    Vital Signs Last 24 Hrs  T(C): 37.1 (18 Sep 2020 01:27), Max: 38.6 (17 Sep 2020 18:29)  T(F): 98.8 (18 Sep 2020 01:27), Max: 101.4 (17 Sep 2020 18:29)  HR: 111 (17 Sep 2020 23:) (110 - 124)  BP: 108/68 (17 Sep 2020 23:01) (108/68 - 167/93)  BP(mean): --  RR: 18 (17 Sep 2020 23:01) (18 - 18)  SpO2: 98% (17 Sep 2020 23:) (97% - 100%)  Height (cm): 149.9 ( @ 14:45)  Weight (kg): 45.4 ( @ 14:45)  BMI (kg/m2): 20.2 ( @ 14:45)      Constitutional: wn/wd in NAD.   HEENT: NCAT, MMM, OP clear, EOMI, , no proptosis or lid retraction  Neck: no thyromegaly or palpable thyroid nodules   Respiratory: lungs CTAB.  Cardiovascular: regular rhythm, normal S1 and S2, no audible murmurs, no peripheral edema  GI: soft, NT/ND, no masses/HSM appreciated.  Neurology: no tremors, DTR 2+  Skin: no visible rashes/lesions. no acanthosis nigricans. no hyperlipotrophy. no cushing's stigmata.  Psychiatric: AAO x 3, normal affect/mood.  Ext: radial pulses intact, DP pulses intact, extremities warm, no cyanosis, clubbing or edema.       LABS:                        10.6   18.20 )-----------( 419      ( 18 Sep 2020 07:04 )             32.9     -18    135  |  95<L>  |  8   ----------------------------<  170<H>  3.6   |  29  |  0.74    Ca    8.8      18 Sep 2020 07:04  Phos  2.9     18  Mg     2.0     18    TPro  8.5<H>  /  Alb  3.5  /  TBili  0.5  /  DBili  x   /  AST  15  /  ALT  13  /  AlkPhos  111      PT/INR - ( 17 Sep 2020 15:27 )   PT: 14.7 sec;   INR: 1.24            Urinalysis Basic - ( 17 Sep 2020 15:50 )    Color: Yellow / Appearance: SL Cloudy / SG: <=1.005 / pH: x  Gluc: x / Ketone: 15 mg/dL  / Bili: Negative / Urobili: 1.0 E.U./dL   Blood: x / Protein: Trace mg/dL / Nitrite: NEGATIVE   Leuk Esterase: NEGATIVE / RBC: < 5 /HPF / WBC Many /HPF   Sq Epi: x / Non Sq Epi: 5-10 /HPF / Bacteria: Present /HPF        Thyroid Stimulating Hormone, Serum: 1.426 ( @ 15:27)      RADIOLOGY & ADDITIONAL STUDIES:  CAPILLARY BLOOD GLUCOSE      POCT Blood Glucose.: 175 mg/dL (18 Sep 2020 08:24)  POCT Blood Glucose.: 272 mg/dL (17 Sep 2020 23:44)  POCT Blood Glucose.: 117 mg/dL (17 Sep 2020 19:30)  POCT Blood Glucose.: 136 mg/dL (17 Sep 2020 19:08)  POCT Blood Glucose.: 157 mg/dL (17 Sep 2020 18:41)  POCT Blood Glucose.: 146 mg/dL (17 Sep 2020 18:06)  POCT Blood Glucose.: 256 mg/dL (17 Sep 2020 17:19)  POCT Blood Glucose.: 552 mg/dL (17 Sep 2020 14:51)      Will discuss in rounds  A/P:Mrs. Sidhu is a 54 yo F with PMH of T2DM, presenting with severe sepsis secondary to pyelonephritis likely due to gross glucosuria, and hyperglycemia.    1.  DM - type 2, uncontrolled, complicated  A1C: 11.2%  Weight: 45kg BMI 20  Cr/GRF: .074/106  c-peptide, SANTI, ICA pending    Please continue lantus       units at night / morning.  Please continue lispro      units before each meal.  Please continue lispro moderate / low dose sliding scale four times daily with meals and at bedtime    Pt's fingerstick glucose goal is 100-180    Will continue to monitor     For discharge, pt can continue    Pt can follow up at discharge with [facility] by calling  to make an appointment.   Will discuss case with Dr. Sparrow   and update primary team    To Make an appointment at [address] [place] [address] for the patient, either:  1. Send a STAT task via [employer] to Verona Salazar or Claudia Jauregui (office managers)   OR  2. Call supervisor's line. P: [phone] (do not give this number to patient). VM is checked periodically.  In the message, specify that this is a hospital discharge follow-up, and that the appt can be made with a NP if there is no timely MD availability.    REMINDERS FOR INSULIN/DIABETES SUPPLIES at DISCHARGE:  INSULIN:   Long actin/Basal Insulin: Examples: Toujeo, Basaglar, Tresiba, Lantus   Short acting/Bolus Insulin: Humalog, Admelog, Novolog  Please ensure that BOTH short acting and long acting insulin are prescribed in the same preparation (Ex: PEN vs VIAL/SOLUTION)     TESTING SUPPLIES:   All glucometer supplies should be written as generic to avoid issues with insurance. Use the free text option in sunrise prescription writer, and type in glucometer test strips, lancets, etc to order.    If sending patient home on insulin PEN, please send:   •	BD elsy insulin pen needles for use up to 4 times daily (total quantity 100)  •	Lancets for use up to 4 times daily (total quantity 100)  •	Glucometer Test strips for use up to 4 times daily (total quantity 100)  •	Alcohol swabs for use up to 4 times daily (total quantity 100)  •	Glucometer (If provided by hospital, still provide scripts for lancets, test strips, and swabs)  If sending patient home on insulin VIAL, please send:   •	Insulin syringes (6mm) - for use up to 4 times daily (total quantity 100)  •	Lancets for use up to 4 times daily (total quantity 100)  •	Generic Glucometer Test strips for use up to 4 times daily (total quantity 100)  •	Alcohol swabs for use up to 4 times daily (total quantity 100)  •	Generic Glucometer (If provided by hospital, still provide scripts for lancets, test strips, and swabs)  •	Do not specify brand for testing supplies (such as contour, freestyle, one touch etc) that way the pharmacy has the freedom to pick and change according to what the insurance dictates.  For patients without insurance:   •	Provide social work with appropriate scripts so they may obtain 1 week of samples  •	Provide with glucometer. Glucometers are located at various nursing stations, the nursing office, education, and endocrine fellows office.  •	Please make appointment with Ericka Joel NP or Fay Burgess RN and SUDHAKAR De Leon at the [address] [place] [address] endocrinology clinic. They can see patients without insurance, provide appropriate samples, and assist in getting insurance coverage.     PREFERRED PHARMACY:  [facility] (located on 1st floor next to admitting)  P: [phone]  Hours: M – F 8AM – 8PM, Sat 8AM – 4PM, Sun—closed  If not using [employer], please follow up with chosen pharmacy to ensure insulin prescribed is covered.        HPI:Mrs. Sidhu is a 56 yo F with PMH T2DM (insulin tresiba, trulicity, januvia, metformin, A1c 11% in spite of reported compliance), who presents with a 10-day history of nausea, vomiting and anorexia. Starting 10 days ago she started to have back pain on the R side, 10/10 in intensity, colicky, non-radiating. This was associated with anorexia, nausea and vomiting; daughter at bedside states "She hasn't eaten in 10 days." She affirms polydipsia, polyuria, and weight loss chronically over last several months. She denies dysuria or hematuria.   ED Vitals: 101.4 (rectal), 118, 145/86, 18, 98% on RA. WBC 18.1, Hgb 11.4, lactate 2.9, sodium nominally 131 (138 corrected for hyperglycemia), Cl 86, HCO3 30, BUN/Cr 13/.75, glucose 507. CTA showed right sided pyelonephritis, hepatic steatosis (LFTs normal), cholelithiasis, and mild spine degeneration. Pancreas and adrenal glands normal. Received Tylenol 1g, CTX 1g, insulin regular 10 U IV, Zofran 4 mg, NS 2L.  - Start date : CTX 2 g q24h until bacteremia can be excluded, can then deescalate to CTX 1 g if WBC downtrend.    This AM temp 100.7 with . WBC 18. She is complaining of pain and declines interview at this time. Poor appetite. Has lost 12 lbs in past 2 weeks, but weight stable prior to current illness. Wero placed freestyle elsy.     FSG & insulin:  :  2PM   5P  Regular insulin 10 units IVP  6P   7P   Bedtime . Lantus 6 and lispro 1  :  Breakfast    Lispro  4  +  2  Ate  Lunch FSG  175    Lispro    +    Ate    She reports she has been trying everything her doctor has recommended to try to control her diabetes, but she still has had persistently high A1c. She does not test blood sugars at home and was never prescribed a meter or taught how to test, in spite of being more than willing to "try anything" to avoid complications of diabetes. Her PCP prescribed elsy, which she never put on. She has not been taking medication since she has been sick.     Age at Dx: 49y/o  Current Therapy: toujeo 25 units daily, metformin, januvia, Trulicity started 3 months ago.  Home FSG: doesn't check, but know how.  Diet: reportedly reasonable.      Hx of hypoglycemia: denies  Hx of DKA/HHS: denies  Complications: no DR-eye exam UTD, no DN  Outpatient Endo: managed by [facility]    FH: Family history of pancreatic cancer, Father,  age 69.  Family history of type 1 diabetes mellitus, Maternal, onset age 60  Family history of type 2 diabetes mellitus in brother  Family history of type 2 diabetes mellitus in sister.    SH: Lives independently in the community. Daughter works in cath lab at [employer].  Smoking  Etoh:  Recreational Drugs:  Social Life:    PMH & Surgical Hx:ACUTE PYELONEPHRITISHYPERGLYCEMIA    Family history of type 1 diabetes mellitus    Family history of type 2 diabetes mellitus in brother    Family history of type 2 diabetes mellitus in sister    Family history of pancreatic cancer    Handoff    MEWS Score    T2DM (type 2 diabetes mellitus)    Acute pyelonephritis    Nutrition, metabolism, and development symptoms    Severe protein-calorie malnutrition    Metabolic alkalosis    Anemia    Type 2 diabetes mellitus with hyperglycemia, with long-term current use of insulin    Acute pyelonephritis    Severe sepsis    No significant past surgical history    VOMITING    Hyperglycemia    SysAdmin_VisitLink            Current Meds:  acetaminophen   Tablet .. 650 milliGRAM(s) Oral every 6 hours PRN  calcium carbonate    500 mG (Tums) Chewable 3 Tablet(s) Chew every 6 hours PRN  cefTRIAXone Injectable. 2000 milliGRAM(s) IV Push every 24 hours  dextrose 40% Gel 15 Gram(s) Oral once PRN  dextrose 5%. 1000 milliLiter(s) IV Continuous <Continuous>  dextrose 50% Injectable 12.5 Gram(s) IV Push once  dextrose 50% Injectable 25 Gram(s) IV Push once  dextrose 50% Injectable 25 Gram(s) IV Push once  enoxaparin Injectable 40 milliGRAM(s) SubCutaneous every 24 hours  glucagon  Injectable 1 milliGRAM(s) IntraMuscular once PRN  influenza   Vaccine 0.5 milliLiter(s) IntraMuscular once  insulin glargine Injectable (LANTUS) 12 Unit(s) SubCutaneous at bedtime  insulin lispro (HumaLOG) corrective regimen sliding scale   SubCutaneous three times a day before meals  insulin lispro (HumaLOG) corrective regimen sliding scale   SubCutaneous at bedtime  insulin lispro Injectable (HumaLOG) 4 Unit(s) SubCutaneous three times a day before meals  melatonin 5 milliGRAM(s) Oral at bedtime PRN  ondansetron Injectable 4 milliGRAM(s) IV Push once PRN  polyethylene glycol 3350 17 Gram(s) Oral two times a day  senna 2 Tablet(s) Oral at bedtime      Allergies:  No Known Allergies      ROS:  Denies the following except as indicated.    General: weight gain  Eyes: Blurry vision, double vision, visual changes  ENT: Throat pain, changes in voice,   CV: palpitations, SOB, CP, cough  GI: diarrhea, difficulty swallowing, abdominal pain  : polyuria, dysuria, + right sided flank pain  Endo: temperature intolerance  MSK: weakness, joint pain  Skin: rash, dryness, diaphoresis  Heme: Easy bruising, bleeding  Neuro: HA, dizziness, lightheadedness, numbness/ tingling  Psych: Anxiety, Depression    Vital Signs Last 24 Hrs  T(C): 37.1 (18 Sep 2020 01:27), Max: 38.6 (17 Sep 2020 18:29)  T(F): 98.8 (18 Sep 2020 01:27), Max: 101.4 (17 Sep 2020 18:29)  HR: 111 (17 Sep 2020 23:) (110 - 124)  BP: 108/68 (17 Sep 2020 23:01) (108/68 - 167/93)  BP(mean): --  RR: 18 (17 Sep 2020 23:01) (18 - 18)  SpO2: 98% (17 Sep 2020 23:01) (97% - 100%)  Height (cm): 149.9 ( @ 14:45)  Weight (kg): 45.4 ( @ 14:45)  BMI (kg/m2): 20.2 ( @ 14:45)      Constitutional: wn/wd in NAD.   HEENT: NCAT, MMM, OP clear, EOMI, , no proptosis or lid retraction  Neck: no thyromegaly or palpable thyroid nodules   Respiratory: lungs CTAB.  Cardiovascular: regular rhythm, normal S1 and S2, no audible murmurs, no peripheral edema  GI: soft, NT/ND, no masses/HSM appreciated. + right flank pain  Neurology: no tremors, DTR 2+  Skin: no visible rashes/lesions. no acanthosis nigricans. no hyperlipotrophy. no cushing's stigmata.  Psychiatric: AAO x 3, normal affect/mood.  Ext: radial pulses intact, DP pulses intact, extremities warm, no cyanosis, clubbing or edema.       LABS:                        10.6   18.20 )-----------( 419      ( 18 Sep 2020 07:04 )             32.9     -18    135  |  95<L>  |  8   ----------------------------<  170<H>  3.6   |  29  |  0.74    Ca    8.8      18 Sep 2020 07:04  Phos  2.9     18  Mg     2.0     18    TPro  8.5<H>  /  Alb  3.5  /  TBili  0.5  /  DBili  x   /  AST  15  /  ALT  13  /  AlkPhos  111  -17    PT/INR - ( 17 Sep 2020 15:27 )   PT: 14.7 sec;   INR: 1.24            Urinalysis Basic - ( 17 Sep 2020 15:50 )    Color: Yellow / Appearance: SL Cloudy / SG: <=1.005 / pH: x  Gluc: x / Ketone: 15 mg/dL  / Bili: Negative / Urobili: 1.0 E.U./dL   Blood: x / Protein: Trace mg/dL / Nitrite: NEGATIVE   Leuk Esterase: NEGATIVE / RBC: < 5 /HPF / WBC Many /HPF   Sq Epi: x / Non Sq Epi: 5-10 /HPF / Bacteria: Present /HPF        Thyroid Stimulating Hormone, Serum: 1.426 ( @ 15:27)      RADIOLOGY & ADDITIONAL STUDIES:  CAPILLARY BLOOD GLUCOSE      POCT Blood Glucose.: 175 mg/dL (18 Sep 2020 08:24)  POCT Blood Glucose.: 272 mg/dL (17 Sep 2020 23:44)  POCT Blood Glucose.: 117 mg/dL (17 Sep 2020 19:30)  POCT Blood Glucose.: 136 mg/dL (17 Sep 2020 19:08)  POCT Blood Glucose.: 157 mg/dL (17 Sep 2020 18:41)  POCT Blood Glucose.: 146 mg/dL (17 Sep 2020 18:06)  POCT Blood Glucose.: 256 mg/dL (17 Sep 2020 17:19)  POCT Blood Glucose.: 552 mg/dL (17 Sep 2020 14:51)        A/P:Mrs. Sidhu is a 56 yo F with PMH of T2DM, presenting with severe sepsis secondary to pyelonephritis likely due to gross glucosuria, and hyperglycemia.    1.  DM - type 2, uncontrolled, complicated  A1C: 11.2%  Weight: 45kg BMI 20  Cr/GRF: .074/106  c-peptide 0.7  SANTI, ICA pending    Please order lantus 8   units at night.  Please start lispro 2   units before each meal.  Please continue lispro low dose sliding scale four times daily with meals and at bedtime    Pt's fingerstick glucose goal is 100-180    Will continue to monitor     For discharge, pt can continue    Pt can follow up at discharge with [facility] by calling  to make an appointment.   Will discuss case with Dr. Sparrow   and update primary team    To Make an appointment at [address] [place] [address] for the patient, either:  1. Send a STAT task via M-Audio to Verona Salazar or Claudia Jauregui (office managers)   OR  2. Call supervisor's line. P: [phone] (do not give this number to patient). VM is checked periodically.  In the message, specify that this is a hospital discharge follow-up, and that the appt can be made with a NP if there is no timely MD availability.    REMINDERS FOR INSULIN/DIABETES SUPPLIES at DISCHARGE:  INSULIN:   Long actin/Basal Insulin: Examples: Toujeo, Basaglar, Tresiba, Lantus   Short acting/Bolus Insulin: Humalog, Admelog, Novolog  Please ensure that BOTH short acting and long acting insulin are prescribed in the same preparation (Ex: PEN vs VIAL/SOLUTION)     TESTING SUPPLIES:   All glucometer supplies should be written as generic to avoid issues with insurance. Use the free text option in sunrise prescription writer, and type in glucometer test strips, lancets, etc to order.    If sending patient home on insulin PEN, please send:   •	BD elsy insulin pen needles for use up to 4 times daily (total quantity 100)  •	Lancets for use up to 4 times daily (total quantity 100)  •	Glucometer Test strips for use up to 4 times daily (total quantity 100)  •	Alcohol swabs for use up to 4 times daily (total quantity 100)  •	Glucometer (If provided by hospital, still provide scripts for lancets, test strips, and swabs)  If sending patient home on insulin VIAL, please send:   •	Insulin syringes (6mm) - for use up to 4 times daily (total quantity 100)  •	Lancets for use up to 4 times daily (total quantity 100)  •	Generic Glucometer Test strips for use up to 4 times daily (total quantity 100)  •	Alcohol swabs for use up to 4 times daily (total quantity 100)  •	Generic Glucometer (If provided by hospital, still provide scripts for lancets, test strips, and swabs)  •	Do not specify brand for testing supplies (such as contour, freestyle, one touch etc) that way the pharmacy has the freedom to pick and change according to what the insurance dictates.  For patients without insurance:   •	Provide social work with appropriate scripts so they may obtain 1 week of samples  •	Provide with glucometer. Glucometers are located at various nursing stations, the nursing office, education, and endocrine fellows office.  •	Please make appointment with Ericka Joel NP or Fay Burgess RN and SUDHAKAR De Leon at the [address] [place] [address] endocrinology clinic. They can see patients without insurance, provide appropriate samples, and assist in getting insurance coverage.     PREFERRED PHARMACY:  [facility] (located on 1st floor next to admitting)  P: [phone]  Hours: M – F 8AM – 8PM, Sat 8AM – 4PM, Sun—closed  If not using [facility], please follow up with chosen pharmacy to ensure insulin prescribed is covered.

## 2020-09-18 NOTE — DISCHARGE NOTE PROVIDER - NSFOLLOWUPCLINICS_GEN_ALL_ED_FT
[facility]  Family Medicine  [address]  [place], [place] [address]  Phone: [phone]  Fax:   Follow Up Time: 2 weeks

## 2020-09-18 NOTE — CONSULT NOTE ADULT - ATTENDING COMMENTS
Pt seen on rounds this afternoon.  55-yo woman with type 2 DM admitted with 10 days of N/V, R-sided back/flank pain and found to have acute pyelonephritis.  DM is treated with a combination of basal insulin (Toujeo), metformin, and Trulicity as outpatient, but she does not monitor fingersticks at home, has an A1c level of 11% and was markedly hyperglycemic (507 mg%) on admission.  She has lost weight during the past 6 months--partly as a result of the Trulicity, partly from lack of PO intake during the past week (plus partly from her uncontrolled hyperglycemia).  Her fingersticks in the hospital have decreased to the 170 range with 6 units of Lantus plus 4 units lispro premeal.    --Would increase the Lantus slightly to 8 units hs  --Start pre-meal standing insulin at only 2 units for now given her poor PO intake  --Her C-peptide level is quite low at 0.7, and would be concerned that she has evolving type I disease--await SANTI antibodies.    --Needs to restart FS monitoring---she is agreeable to this.

## 2020-09-18 NOTE — DISCHARGE NOTE PROVIDER - NSDCMRMEDTOKEN_GEN_ALL_CORE_FT
BRIANUVIA 100 MG TABLET:   METFORMIN  MG TABLET: 1  orally 2 times a day  TOUJEO SOLOSTAR 300 UNIT/ML: 25  subcutaneous once a day (at bedtime)  TRULICITY 0.75 MG/0.5 ML PEN: inject subcutaneously every week as directed   freestyle elsy: 1 applicator transdermally every 14 days  JANUVIA 100 MG TABLET:   METFORMIN  MG TABLET: 1  orally 2 times a day  TOUJEO SOLOSTAR 300 UNIT/ML: 25  subcutaneous once a day (at bedtime)  TRULICITY 0.75 MG/0.5 ML PEN: inject subcutaneously every week as directed   alcohol swabs : Apply topically to affected area 4 times a day   freestyle elsy: 1 applicator transdermally every 14 days  glucometer (per patient&#x27;s insurance): Test blood sugars four times a day. Dispense #1 glucometer.  JANUVIA 100 MG TABLET:   lancets: 1 application subcutaneously 4 times a day   METFORMIN  MG TABLET: 1  orally 2 times a day  test strips (per patient&#x27;s insurance): 1 application subcutaneously 4 times a day. ** Compatible with patient&#x27;s glucometer **  TOUJEO SOLOSTAR 300 UNIT/ML: 25  subcutaneous once a day (at bedtime)  TRULICITY 0.75 MG/0.5 ML PEN: inject subcutaneously every week as directed   alcohol swabs : Apply topically to affected area 4 times a day   freestyle elsy: 1 applicator transdermally every 14 days  glucometer (per patient&#x27;s insurance): Test blood sugars four times a day. Dispense #1 glucometer.  insulin glargine: 8 unit(s) subcutaneous once a day  insulin lispro (concentrated) 200 units/mL subcutaneous solution: 2 unit(s) subcutaneous 3 times a day   JANUVIA 100 MG TABLET:   lancets: 1 application subcutaneously 4 times a day   METFORMIN  MG TABLET: 1  orally 2 times a day  test strips (per patient&#x27;s insurance): 1 application subcutaneously 4 times a day. ** Compatible with patient&#x27;s glucometer **  TOUJEO SOLOSTAR 300 UNIT/ML: 25  subcutaneous once a day (at bedtime)  TRULICITY 0.75 MG/0.5 ML PEN: inject subcutaneously every week as directed   alcohol swabs : Apply topically to affected area 4 times a day   freestyle elsy: 1 applicator transdermally every 14 days  glucometer (per patient&#x27;s insurance): Test blood sugars four times a day. Dispense #1 glucometer.  insulin glargine: 8 unit(s) subcutaneous once a day  insulin glargine 100 units/mL subcutaneous solution: 10 unit(s) subcutaneous once a day (at bedtime)   insulin pen needles: 1 unit(s) subcutaneous 3 times a day   JANUVIA 100 MG TABLET:   lancets: 1 application subcutaneously 4 times a day   METFORMIN  MG TABLET: 1  orally 2 times a day  test strips (per patient&#x27;s insurance): 1 application subcutaneously 4 times a day. ** Compatible with patient&#x27;s glucometer **  TRULICITY 0.75 MG/0.5 ML PEN: inject subcutaneously every week as directed   alcohol swabs : Apply topically to affected area 4 times a day   freestyle elsy: 1 applicator transdermally every 14 days  glucometer (per patient&#x27;s insurance): Test blood sugars four times a day. Dispense #1 glucometer.  insulin glargine 100 units/mL subcutaneous solution: 10 unit(s) subcutaneous once a day (at bedtime)   insulin pen needles: 1 unit(s) subcutaneous 3 times a day   JANUVIA 100 MG TABLET:   lancets: 1 application subcutaneously 4 times a day   METFORMIN  MG TABLET: 1  orally 2 times a day  test strips (per patient&#x27;s insurance): 1 application subcutaneously 4 times a day. ** Compatible with patient&#x27;s glucometer **  TRULICITY 0.75 MG/0.5 ML PEN: inject subcutaneously every week as directed

## 2020-09-18 NOTE — DISCHARGE NOTE PROVIDER - NSDCFUSCHEDAPPT_GEN_ALL_CORE_FT
NAHUM HOLDER ; 10/05/2020 ; [facility] [address] NAHUM SANTANA ; 10/05/2020 ; [facility] [address]

## 2020-09-18 NOTE — DISCHARGE NOTE PROVIDER - NSDCFUADDAPPT_GEN_ALL_CORE_FT
Please follow up with your Primary Care Provider, Eve Boyd NP at [address], [address], [place] [address] on 10/5/2020 at 11:30am.    Please bring your discharge papwerwork, insurance card and ID to this appointment.    Appointment was scheduled by Ms. MICHELLE Grimes, Referral Coordinator. Please follow up with the endocrinologist NP Eve Boyd at [address], [address], [place] [address] on 10/5/2020 at 11:30am.    Please bring your discharge paperwork, insurance card and ID to this appointment.    Appointment was scheduled by Ms. MICHELLE Grimes, Referral Coordinator. Please follow up with the endocrinologist NP Eve Boyd at [address], [address], [place] [address] on 10/5/2020 at 11:30am.    You can follow up with the [facility] by calling the number listed  [phone] and indicate you are calling for a hospital discharge appointment    Appointment was scheduled by Ms. MICHELLE Grimes, Referral Coordinator. Please follow up with the endocrinologist NP Eve Boyd at [address], [address], [place] [address] on 10/5/2020 at 11:30am.    You can follow up with the [facility] by calling the number listed  [phone] and indicate you are calling for a hospital discharge appointment and E.coli sensitivity     Appointment was scheduled by Ms. MICHELLE Grimes, Referral Coordinator.

## 2020-09-18 NOTE — DISCHARGE NOTE PROVIDER - NSDCCPCAREPLAN_GEN_ALL_CORE_FT
PRINCIPAL DISCHARGE DIAGNOSIS  Diagnosis: Acute pyelonephritis  Assessment and Plan of Treatment: You were diagnosed with pyelonephritis, which is an infection of the kidney that usually begins as an infection of the urinary tract and bladder that has spread upwards toward your kidneys. You were treated with intravenous antibiotics while in the hospital, and will complete the course of antibiotics at home with pills you can take by mouth. Please take your Cefpodoxime 4x daily. Please be sure to finish the entire amount of antibiotics, even if you feel completely better because that will help ensure you do not get a similar infection in the future.   It is also very important to keep your sugars low as this will help prevent getting another infection in the future. Please use your glucose meter, insulin, and diabetes medications, and follow up with the endocrinologist you met while you were in the hospital.      SECONDARY DISCHARGE DIAGNOSES  Diagnosis: Hyperglycemia  Assessment and Plan of Treatment: You had very high sugars to 550 when you came to the hospital. Normal levels are around 100-140. We gave you insulin when you came in, as well as throughout the time you were here. Having high sugars can increase the likelihood of complications from diabetes and getting an infection such as the one you had, in the future.     PRINCIPAL DISCHARGE DIAGNOSIS  Diagnosis: Acute pyelonephritis  Assessment and Plan of Treatment: You were diagnosed with pyelonephritis, which is an infection of the kidney that usually begins as an infection of the urinary tract and bladder that has spread upwards toward your kidneys. You were treated with intravenous antibiotics while in the hospital, and will complete the course of antibiotics at home with pills you can take by mouth. Please take your Cefpodoxime 4x daily, last day 9/27/20. Please be sure to finish the entire amount of antibiotics, even if you feel completely better because that will help ensure you do not get a similar infection in the future.   It is also very important to keep your sugars low as this will help prevent getting another infection in the future. Please use your glucose meter, insulin, and diabetes medications, and follow up with the endocrinologist you met while you were in the hospital.      SECONDARY DISCHARGE DIAGNOSES  Diagnosis: Hyperglycemia  Assessment and Plan of Treatment: You had very high sugars to 550 when you came to the hospital. Normal levels are around 100-140. We gave you insulin when you came in, as well as throughout the time you were here. Having high sugars can increase the likelihood of complications from diabetes and getting an infection such as the one you had, in the future. Ednocrinology service started you on a new insulin treatment with 8Units of insulin Lantus and 2Units insulin Lispro befor meals.     PRINCIPAL DISCHARGE DIAGNOSIS  Diagnosis: Acute pyelonephritis  Assessment and Plan of Treatment: You were diagnosed with pyelonephritis, which is an infection of the kidney that usually begins as an infection of the urinary tract and bladder that has spread upwards toward your kidneys. You were treated with intravenous antibiotics while in the hospital, and will complete the course of antibiotics at home with pills you can take by mouth. Please take your Cefpodoxime 4x daily, last day 9/27/20. Please be sure to finish the entire amount of antibiotics, even if you feel completely better because that will help ensure you do not get a similar infection in the future.   It is also very important to keep your sugars low as this will help prevent getting another infection in the future. Please use your glucose meter, insulin, and diabetes medications, and follow up with the endocrinologist you met while you were in the hospital.      SECONDARY DISCHARGE DIAGNOSES  Diagnosis: Hyperglycemia  Assessment and Plan of Treatment: You had very high sugars to 550 when you came to the hospital. Normal levels are around 100-140. We gave you insulin when you came in, as well as throughout the time you were here. Having high sugars can increase the likelihood of complications from diabetes and getting an infection such as the one you had, in the future. Ednocrinology service started you on a new insulin treatment with 10Units of insulin Lantus and contiune with your home medications of Metformin, Jenuvia and Trulicity. Please follow up with endocrinology.

## 2020-09-18 NOTE — ADVANCED PRACTICE NURSE CONSULT - RECOMMEDATIONS
Diabetes Self care initiated to include assessment needs  Request RX of glucose meter- If not already requested at time of this note.    Request f/u appt with [facility] if not already acquired at time of this note.

## 2020-09-18 NOTE — PROGRESS NOTE ADULT - PROBLEM SELECTOR PLAN 3
Glucose 552, improving markedly s/p 10 U IV insulin in ED. Given her A1c she most likely should be d/c on basal bolus insulin and is agreeable. Would continue metformin on d/c at appropriate dose (ideally 1000 mg BID). No evidence of renal dysfunction.  - f/u anti-SANTI, anti-islet, anti-insulin Ab  - f/u C-peptide to assess endogenous insulin secretory capacity  - Lantus 6 qhs   - lispro 4 u TID for pre-meal coverage  - MDSSI  - diabetes education consult this AM  - endocrine consult this AM, patient would like to establish with an outpatient endocrinologist to better control her T2DM Glucose 552, improving markedly s/p 10 U IV insulin in ED. Given her A1c she most likely should be d/c on basal bolus insulin and is agreeable. Would continue metformin on d/c at appropriate dose (ideally 1000 mg BID). No evidence of renal dysfunction.  - f/u anti-SANTI, anti-islet, anti-insulin Ab  - f/u C-peptide to assess endogenous insulin secretory capacity  - Lantus 10 qhs   - lispro 4 u TID for pre-meal coverage  - MDSSI  - diabetes education consult   - endocrine consult, patient would like to establish with an outpatient endocrinologist to better control her T2DM

## 2020-09-18 NOTE — DISCHARGE NOTE PROVIDER - CARE PROVIDER_API CALL
Eve Boyd (NP; RN)  NP in Primary Care AdultGerontology  [address] [place] [address], [place] [address]  Phone: [phone]  Fax: [phone]  Follow Up Time: 2 weeks   Eve Boyd  ADULT CARE NURSE PRACTITIONER  [address], [address]  [place], [place] [address]  Phone: [phone]  Fax: [phone]  Scheduled Appointment: 10/05/2020 11:30 AM

## 2020-09-18 NOTE — ADVANCED PRACTICE NURSE CONSULT - REASON FOR CONSULT
MARIETTA contacted by Chelsy Mark MD, RE: pt with uncontrolled DM who indicates that she" takes her medications as rx and does not understand why her bg are uncontrolled.    MARIETTA spoke with pt and daughter ( at bedside who is also a colleague).  Pt informed that:   I have not been taking my medications for several days since I was ill and not eating.  I do not have a glucose meter at home, but I was prescribed the freestyle elsy-which I never put on since I was not eating.    MARIETTA asked pt to demonstrate injection technique and site selection and rotation - using models.

## 2020-09-19 ENCOUNTER — TRANSCRIPTION ENCOUNTER (OUTPATIENT)
Age: 55
End: 2020-09-19

## 2020-09-19 VITALS
HEART RATE: 104 BPM | RESPIRATION RATE: 18 BRPM | OXYGEN SATURATION: 100 % | DIASTOLIC BLOOD PRESSURE: 78 MMHG | TEMPERATURE: 98 F | SYSTOLIC BLOOD PRESSURE: 122 MMHG

## 2020-09-19 LAB
ANION GAP SERPL CALC-SCNC: 11 MMOL/L — SIGNIFICANT CHANGE UP (ref 5–17)
BASOPHILS # BLD AUTO: 0.08 K/UL — SIGNIFICANT CHANGE UP (ref 0–0.2)
BASOPHILS NFR BLD AUTO: 0.5 % — SIGNIFICANT CHANGE UP (ref 0–2)
BUN SERPL-MCNC: 7 MG/DL — SIGNIFICANT CHANGE UP (ref 7–23)
CALCIUM SERPL-MCNC: 8.7 MG/DL — SIGNIFICANT CHANGE UP (ref 8.4–10.5)
CHLORIDE SERPL-SCNC: 96 MMOL/L — SIGNIFICANT CHANGE UP (ref 96–108)
CO2 SERPL-SCNC: 30 MMOL/L — SIGNIFICANT CHANGE UP (ref 22–31)
CREAT SERPL-MCNC: 0.66 MG/DL — SIGNIFICANT CHANGE UP (ref 0.5–1.3)
EOSINOPHIL # BLD AUTO: 0.13 K/UL — SIGNIFICANT CHANGE UP (ref 0–0.5)
EOSINOPHIL NFR BLD AUTO: 0.8 % — SIGNIFICANT CHANGE UP (ref 0–6)
GLUCOSE BLDC GLUCOMTR-MCNC: 146 MG/DL — HIGH (ref 70–99)
GLUCOSE BLDC GLUCOMTR-MCNC: 197 MG/DL — HIGH (ref 70–99)
GLUCOSE SERPL-MCNC: 130 MG/DL — HIGH (ref 70–99)
HCT VFR BLD CALC: 30.2 % — LOW (ref 34.5–45)
HGB BLD-MCNC: 9.8 G/DL — LOW (ref 11.5–15.5)
IMM GRANULOCYTES NFR BLD AUTO: 0.9 % — SIGNIFICANT CHANGE UP (ref 0–1.5)
LYMPHOCYTES # BLD AUTO: 1.97 K/UL — SIGNIFICANT CHANGE UP (ref 1–3.3)
LYMPHOCYTES # BLD AUTO: 12.2 % — LOW (ref 13–44)
MAGNESIUM SERPL-MCNC: 1.9 MG/DL — SIGNIFICANT CHANGE UP (ref 1.6–2.6)
MCHC RBC-ENTMCNC: 27.2 PG — SIGNIFICANT CHANGE UP (ref 27–34)
MCHC RBC-ENTMCNC: 32.5 GM/DL — SIGNIFICANT CHANGE UP (ref 32–36)
MCV RBC AUTO: 83.9 FL — SIGNIFICANT CHANGE UP (ref 80–100)
MONOCYTES # BLD AUTO: 0.67 K/UL — SIGNIFICANT CHANGE UP (ref 0–0.9)
MONOCYTES NFR BLD AUTO: 4.1 % — SIGNIFICANT CHANGE UP (ref 2–14)
NEUTROPHILS # BLD AUTO: 13.2 K/UL — HIGH (ref 1.8–7.4)
NEUTROPHILS NFR BLD AUTO: 81.5 % — HIGH (ref 43–77)
NRBC # BLD: 0 /100 WBCS — SIGNIFICANT CHANGE UP (ref 0–0)
PHOSPHATE SERPL-MCNC: 2.2 MG/DL — LOW (ref 2.5–4.5)
PLATELET # BLD AUTO: 381 K/UL — SIGNIFICANT CHANGE UP (ref 150–400)
POTASSIUM SERPL-MCNC: 3.6 MMOL/L — SIGNIFICANT CHANGE UP (ref 3.5–5.3)
POTASSIUM SERPL-SCNC: 3.6 MMOL/L — SIGNIFICANT CHANGE UP (ref 3.5–5.3)
RBC # BLD: 3.6 M/UL — LOW (ref 3.8–5.2)
RBC # FLD: 12.7 % — SIGNIFICANT CHANGE UP (ref 10.3–14.5)
SODIUM SERPL-SCNC: 137 MMOL/L — SIGNIFICANT CHANGE UP (ref 135–145)
WBC # BLD: 16.19 K/UL — HIGH (ref 3.8–10.5)
WBC # FLD AUTO: 16.19 K/UL — HIGH (ref 3.8–10.5)

## 2020-09-19 PROCEDURE — 82962 GLUCOSE BLOOD TEST: CPT

## 2020-09-19 PROCEDURE — 87040 BLOOD CULTURE FOR BACTERIA: CPT

## 2020-09-19 PROCEDURE — 84681 ASSAY OF C-PEPTIDE: CPT

## 2020-09-19 PROCEDURE — 82010 KETONE BODYS QUAN: CPT

## 2020-09-19 PROCEDURE — 84443 ASSAY THYROID STIM HORMONE: CPT

## 2020-09-19 PROCEDURE — 74177 CT ABD & PELVIS W/CONTRAST: CPT

## 2020-09-19 PROCEDURE — 84100 ASSAY OF PHOSPHORUS: CPT

## 2020-09-19 PROCEDURE — 83036 HEMOGLOBIN GLYCOSYLATED A1C: CPT

## 2020-09-19 PROCEDURE — 86341 ISLET CELL ANTIBODY: CPT

## 2020-09-19 PROCEDURE — 87186 SC STD MICRODIL/AGAR DIL: CPT

## 2020-09-19 PROCEDURE — 81001 URINALYSIS AUTO W/SCOPE: CPT

## 2020-09-19 PROCEDURE — 86337 INSULIN ANTIBODIES: CPT

## 2020-09-19 PROCEDURE — 80053 COMPREHEN METABOLIC PANEL: CPT

## 2020-09-19 PROCEDURE — 83735 ASSAY OF MAGNESIUM: CPT

## 2020-09-19 PROCEDURE — 83540 ASSAY OF IRON: CPT

## 2020-09-19 PROCEDURE — 99233 SBSQ HOSP IP/OBS HIGH 50: CPT

## 2020-09-19 PROCEDURE — 96374 THER/PROPH/DIAG INJ IV PUSH: CPT | Mod: XU

## 2020-09-19 PROCEDURE — 36415 COLL VENOUS BLD VENIPUNCTURE: CPT

## 2020-09-19 PROCEDURE — 96375 TX/PRO/DX INJ NEW DRUG ADDON: CPT

## 2020-09-19 PROCEDURE — 85610 PROTHROMBIN TIME: CPT

## 2020-09-19 PROCEDURE — 86803 HEPATITIS C AB TEST: CPT

## 2020-09-19 PROCEDURE — 86769 SARS-COV-2 COVID-19 ANTIBODY: CPT

## 2020-09-19 PROCEDURE — 83690 ASSAY OF LIPASE: CPT

## 2020-09-19 PROCEDURE — 84466 ASSAY OF TRANSFERRIN: CPT

## 2020-09-19 PROCEDURE — 82728 ASSAY OF FERRITIN: CPT

## 2020-09-19 PROCEDURE — 83930 ASSAY OF BLOOD OSMOLALITY: CPT

## 2020-09-19 PROCEDURE — 80048 BASIC METABOLIC PNL TOTAL CA: CPT

## 2020-09-19 PROCEDURE — 83605 ASSAY OF LACTIC ACID: CPT

## 2020-09-19 PROCEDURE — 99285 EMERGENCY DEPT VISIT HI MDM: CPT | Mod: 25

## 2020-09-19 PROCEDURE — 82803 BLOOD GASES ANY COMBINATION: CPT

## 2020-09-19 PROCEDURE — 85045 AUTOMATED RETICULOCYTE COUNT: CPT

## 2020-09-19 PROCEDURE — U0003: CPT

## 2020-09-19 PROCEDURE — 87086 URINE CULTURE/COLONY COUNT: CPT

## 2020-09-19 PROCEDURE — 93005 ELECTROCARDIOGRAM TRACING: CPT

## 2020-09-19 PROCEDURE — 85025 COMPLETE CBC W/AUTO DIFF WBC: CPT

## 2020-09-19 RX ORDER — CEFPODOXIME PROXETIL 100 MG
5 TABLET ORAL
Qty: 140 | Refills: 0
Start: 2020-09-19 | End: 2020-09-25

## 2020-09-19 RX ORDER — INSULIN GLARGINE 100 [IU]/ML
8 INJECTION, SOLUTION SUBCUTANEOUS
Qty: 0 | Refills: 0 | DISCHARGE
Start: 2020-09-19

## 2020-09-19 RX ORDER — INSULIN GLARGINE 100 [IU]/ML
25 INJECTION, SOLUTION SUBCUTANEOUS
Qty: 0 | Refills: 0 | DISCHARGE

## 2020-09-19 RX ORDER — INSULIN LISPRO 100/ML
2 VIAL (ML) SUBCUTANEOUS
Qty: 1 | Refills: 0
Start: 2020-09-19

## 2020-09-19 RX ORDER — POTASSIUM CHLORIDE 20 MEQ
20 PACKET (EA) ORAL ONCE
Refills: 0 | Status: COMPLETED | OUTPATIENT
Start: 2020-09-19 | End: 2020-09-19

## 2020-09-19 RX ORDER — POTASSIUM PHOSPHATE, MONOBASIC POTASSIUM PHOSPHATE, DIBASIC 236; 224 MG/ML; MG/ML
15 INJECTION, SOLUTION INTRAVENOUS ONCE
Refills: 0 | Status: COMPLETED | OUTPATIENT
Start: 2020-09-19 | End: 2020-09-19

## 2020-09-19 RX ORDER — INSULIN GLARGINE 100 [IU]/ML
10 INJECTION, SOLUTION SUBCUTANEOUS
Qty: 1 | Refills: 0
Start: 2020-09-19

## 2020-09-19 RX ADMIN — Medication 2 UNIT(S): at 12:32

## 2020-09-19 RX ADMIN — Medication 2 UNIT(S): at 09:05

## 2020-09-19 RX ADMIN — POTASSIUM PHOSPHATE, MONOBASIC POTASSIUM PHOSPHATE, DIBASIC 63.75 MILLIMOLE(S): 236; 224 INJECTION, SOLUTION INTRAVENOUS at 09:05

## 2020-09-19 RX ADMIN — Medication 1: at 12:32

## 2020-09-19 RX ADMIN — POLYETHYLENE GLYCOL 3350 17 GRAM(S): 17 POWDER, FOR SOLUTION ORAL at 05:29

## 2020-09-19 RX ADMIN — Medication 3 TABLET(S): at 13:31

## 2020-09-19 RX ADMIN — Medication 20 MILLIEQUIVALENT(S): at 09:05

## 2020-09-19 NOTE — DISCHARGE NOTE NURSING/CASE MANAGEMENT/SOCIAL WORK - PATIENT PORTAL LINK FT
You can access the FollowMyHealth Patient Portal offered by [facility] by registering at the following website: [email or web address]. By joining [employer]’s FollowMyHealth portal, you will also be able to view your health information using other applications (apps) compatible with our system.

## 2020-09-19 NOTE — PROGRESS NOTE ADULT - PROBLEM SELECTOR PLAN 4
Hgb 11.4 --> 10.9 in setting of fluid resus but given thrombocytosis and other labs consistent with hemoconcentration prior to fluids. MCV 84. Normal RDW. Unclear etiology; no s/sx bleeding. Iron studies c/w chronic disease picture (low total iron, elevated ferritin, normal retic)  - referral for age-appropriate cancer screenings upon discharge
Hgb 11.4 --> 10.9 in setting of fluid resus but given thrombocytosis and other labs consistent with hemoconcentration prior to fluids. MCV 84. Normal RDW. Unclear etiology; no s/sx bleeding. Iron studies c/w chronic disease picture (low total iron, elevated ferritin, normal retic)  - referral for age-appropriate cancer screenings upon discharge

## 2020-09-19 NOTE — PROGRESS NOTE ADULT - PROBLEM SELECTOR PLAN 6
Weight loss most likely due to diabetes but cannot exclude underlying malignancy when weight loss is this severe, so needs referral for age-appropriate cancer screenings upon discharge.   - Phos 2.9, Mg 2, Potassium 3.6   - monitor phos and lytes closely for s/sx refeeding syndrome
Weight loss most likely due to diabetes but cannot exclude underlying malignancy when weight loss is this severe, so needs referral for age-appropriate cancer screenings upon discharge.   - Phos 2.9, Mg 2, Potassium 3.6   - monitor phos and lytes closely for s/sx refeeding syndrome

## 2020-09-19 NOTE — PROGRESS NOTE ADULT - PROBLEM SELECTOR PLAN 5
Consistent with vomiting (loss stomach HCl causes loss of chloride anion, and loss of H+ causes accumulation of HCO3) mixed with contraction alkalosis of hypovolemia.   - Cl uptrending 86 --> 95, patient no longer alkalotic
Consistent with vomiting (loss stomach HCl causes loss of chloride anion, and loss of H+ causes accumulation of HCO3) mixed with contraction alkalosis of hypovolemia.   - Cl uptrending 86 --> 95, patient no longer alkalotic

## 2020-09-19 NOTE — PROGRESS NOTE ADULT - PROBLEM SELECTOR PLAN 7
F: LR @ 80 x12 hours while still nauseous  E: replete K>4  N: consistent CHO with evening snack    # Need for prophylactic measure  DVT ppx: Lovenox, SCDs  GI: none indicated  Discharge today.

## 2020-09-19 NOTE — PROGRESS NOTE ADULT - PROBLEM SELECTOR PLAN 1
SIRS 3/4 (WBC 18.1, febrile to 101.4, tachycardic 118), lactate 2.9, cleared to 1.3 after 2L NS. Patient no longer meeting SIRS criteria with WBC down to 16.19.  - Start date 9/17: CTX 2 g q24h until bacteremia can be excluded, can then deescalate to CTX 1 g if WBC downtrend  - Blood cultures are NGTD.  - Urine Cx with E. Coli. Sensitivity pending.
SIRS 3/4 (WBC 18.1, febrile to 101.4, tachycardic 118), lactate 2.9, cleared to 1.3 after 2L NS. Patient no longer meeting SIRS criteria but WBC still elevated to 18.2.  - Start date 9/17: CTX 2 g q24h until bacteremia can be excluded, can then deescalate to CTX 1 g if WBC downtrend  - f/u BCx, UCx sent 9/17 (admission)

## 2020-09-19 NOTE — PROGRESS NOTE ADULT - SUBJECTIVE AND OBJECTIVE BOX
Patient is a 55y old  Female who presents with a chief complaint of flank pain (18 Sep 2020 11:43)      SUBJECTIVE / OVERNIGHT EVENTS:  Afebrile.  Feels markedly improved.  Tolerating PO, ambulating.   FS's are improved.    MEDICATIONS  (STANDING):  cefTRIAXone Injectable. 2000 milliGRAM(s) IV Push every 24 hours  dextrose 5%. 1000 milliLiter(s) (50 mL/Hr) IV Continuous <Continuous>  dextrose 50% Injectable 12.5 Gram(s) IV Push once  dextrose 50% Injectable 25 Gram(s) IV Push once  dextrose 50% Injectable 25 Gram(s) IV Push once  enoxaparin Injectable 40 milliGRAM(s) SubCutaneous every 24 hours  influenza   Vaccine 0.5 milliLiter(s) IntraMuscular once  insulin glargine Injectable (LANTUS) 8 Unit(s) SubCutaneous at bedtime  insulin lispro (HumaLOG) corrective regimen sliding scale   SubCutaneous three times a day before meals  insulin lispro (HumaLOG) corrective regimen sliding scale   SubCutaneous at bedtime  insulin lispro Injectable (HumaLOG) 2 Unit(s) SubCutaneous three times a day before meals  polyethylene glycol 3350 17 Gram(s) Oral two times a day  senna 2 Tablet(s) Oral at bedtime    MEDICATIONS  (PRN):  acetaminophen   Tablet .. 650 milliGRAM(s) Oral every 6 hours PRN Mild Pain (1 - 3)  calcium carbonate    500 mG (Tums) Chewable 3 Tablet(s) Chew every 6 hours PRN Dyspepsia  dextrose 40% Gel 15 Gram(s) Oral once PRN Blood Glucose LESS THAN 70 milliGRAM(s)/deciliter  glucagon  Injectable 1 milliGRAM(s) IntraMuscular once PRN Glucose LESS THAN 70 milligrams/deciliter  melatonin 5 milliGRAM(s) Oral at bedtime PRN Sleep  ondansetron Injectable 4 milliGRAM(s) IV Push once PRN Nausea and/or Vomiting      CAPILLARY BLOOD GLUCOSE      POCT Blood Glucose.: 146 mg/dL (19 Sep 2020 08:11)  POCT Blood Glucose.: 145 mg/dL (18 Sep 2020 21:31)  POCT Blood Glucose.: 104 mg/dL (18 Sep 2020 17:42)  POCT Blood Glucose.: 175 mg/dL (18 Sep 2020 12:16)    I&O's Summary      PHYSICAL EXAM:  Vital Signs Last 24 Hrs  T(C): 37.6 (19 Sep 2020 06:13), Max: 37.6 (19 Sep 2020 06:13)  T(F): 99.7 (19 Sep 2020 06:13), Max: 99.7 (19 Sep 2020 06:13)  HR: 104 (19 Sep 2020 06:13) (104 - 114)  BP: 109/70 (19 Sep 2020 06:13) (109/70 - 131/79)  BP(mean): --  RR: 18 (19 Sep 2020 06:13) (18 - 19)  SpO2: 97% (19 Sep 2020 06:13) (95% - 100%)  GENERAL: NAD, well-developed  HEAD:  Atraumatic, Normocephalic  EYES: EOMI, PERRLA, conjunctiva and sclera clear  NECK: Supple, No JVD  CHEST/LUNG: Clear to auscultation bilaterally; No wheeze  HEART: Regular rate and rhythm; No murmurs, rubs, or gallops  ABDOMEN: Soft, Nontender, Nondistended; Bowel sounds present. Minimal right sided flank tenderness  EXTREMITIES:  2+ Peripheral Pulses, No clubbing, cyanosis, or edema  PSYCH: AAOx3  NEUROLOGY: non-focal  SKIN: No rashes or lesions    LABS:                        9.8    16.19 )-----------( 381      ( 19 Sep 2020 07:10 )             30.2     09-19    137  |  96  |  7   ----------------------------<  130<H>  3.6   |  30  |  0.66    Ca    8.7      19 Sep 2020 07:10  Phos  2.2     09-19  Mg     1.9     09-19    TPro  8.5<H>  /  Alb  3.5  /  TBili  0.5  /  DBili  x   /  AST  15  /  ALT  13  /  AlkPhos  111  09-17    PT/INR - ( 17 Sep 2020 15:27 )   PT: 14.7 sec;   INR: 1.24                Urinalysis Basic - ( 17 Sep 2020 15:50 )    Color: Yellow / Appearance: SL Cloudy / SG: <=1.005 / pH: x  Gluc: x / Ketone: 15 mg/dL  / Bili: Negative / Urobili: 1.0 E.U./dL   Blood: x / Protein: Trace mg/dL / Nitrite: NEGATIVE   Leuk Esterase: NEGATIVE / RBC: < 5 /HPF / WBC Many /HPF   Sq Epi: x / Non Sq Epi: 5-10 /HPF / Bacteria: Present /HPF        RADIOLOGY & ADDITIONAL TESTS:    Imaging Personally Reviewed:    Consultant(s) Notes Reviewed:      Care Discussed with Consultants/Other Providers:

## 2020-09-19 NOTE — PROGRESS NOTE ADULT - PROBLEM SELECTOR PLAN 2
Patient with evidence of R sided pyelo on CT A/P with severe R CVAT on exam. Plan as above for sepsis  - can discharge on Cefpodoxime PO for total abx course of 10-14d
Patient with evidence of R sided pyelo on CT A/P with severe R CVAT on exam. Plan as above for sepsis  - can discharge on Cefpodoxime PO for total abx course of 10-14d

## 2020-09-19 NOTE — DISCHARGE NOTE NURSING/CASE MANAGEMENT/SOCIAL WORK - NSDCFUADDAPPT_GEN_ALL_CORE_FT
Please follow up with the endocrinologist NP Eve Boyd at [address], [address], [place] [address] on 10/5/2020 at 11:30am.    You can follow up with the [facility] by calling the number listed  [phone] and indicate you are calling for a hospital discharge appointment and E.coli sensitivity     Appointment was scheduled by Ms. MICHELLE Grimes, Referral Coordinator.

## 2020-09-19 NOTE — PROGRESS NOTE ADULT - PROBLEM SELECTOR PLAN 3
- Endocrinology consult appreciated.  - Patient doing well on Lantus 8 units and Humalog 2 units prior to meals.   - Patient to be discharged on those meds, with outpatient Endo follow up.

## 2020-09-19 NOTE — PROGRESS NOTE ADULT - ATTENDING COMMENTS
The patient was seen at the bedside with the endocrinology Fellow. she was admitted with pyelonephritis. and Hgb A1C 11.2%. C-peptide and antiislet cell antibodies are pending as this is new diabetes Type uncertain. I agree with discharging her on 10 units Glargine Insulin and 4 units premeal Humalog Insulin. Glucoses have been since predinner last night 104-145-146.

## 2020-09-19 NOTE — PROGRESS NOTE ADULT - ASSESSMENT
Mrs. Sidhu is a 56 yo F with PMH of T2DM, inadequately controlled on current regimen in spite of patient compliance, presenting with severe sepsis secondary to pyelonephritis likely due to gross glucosuria, now treated with 2g Ceftriaxone qd.

## 2020-09-19 NOTE — PROGRESS NOTE ADULT - SUBJECTIVE AND OBJECTIVE BOX
INTERVAL HPI/OVERNIGHT EVENTS:    Patient is a 55y old  Female who presents with a chief complaint of flank pain (19 Sep 2020 11:20)  pt was seen and examined at the bedside. no acute events overnight. pt denies any complaints today. good appetite.     FSG & Insulin received:    Yesterday:  pre-dinner fs  bedtime fs  lantus  8 units     Today:  pre-breakfast fs  nutritional lispro  2 units        Pt reports the following symptoms:    CONSTITUTIONAL:  Negative fever or chills, feels well, good appetite  EYES:  Negative  blurry vision or double vision  CARDIOVASCULAR:  Negative for chest pain or palpitations  RESPIRATORY:  Negative for cough, wheezing, or SOB   GASTROINTESTINAL:  Negative for nausea, vomiting, diarrhea, constipation, or abdominal pain  GENITOURINARY:  Negative frequency, urgency   NEUROLOGIC:  No headache, confusion, dizziness, lightheadedness    MEDICATIONS  (STANDING):  cefTRIAXone Injectable. 2000 milliGRAM(s) IV Push every 24 hours  dextrose 5%. 1000 milliLiter(s) (50 mL/Hr) IV Continuous <Continuous>  dextrose 50% Injectable 12.5 Gram(s) IV Push once  dextrose 50% Injectable 25 Gram(s) IV Push once  dextrose 50% Injectable 25 Gram(s) IV Push once  enoxaparin Injectable 40 milliGRAM(s) SubCutaneous every 24 hours  influenza   Vaccine 0.5 milliLiter(s) IntraMuscular once  insulin glargine Injectable (LANTUS) 8 Unit(s) SubCutaneous at bedtime  insulin lispro (HumaLOG) corrective regimen sliding scale   SubCutaneous three times a day before meals  insulin lispro (HumaLOG) corrective regimen sliding scale   SubCutaneous at bedtime  insulin lispro Injectable (HumaLOG) 2 Unit(s) SubCutaneous three times a day before meals  polyethylene glycol 3350 17 Gram(s) Oral two times a day  senna 2 Tablet(s) Oral at bedtime    MEDICATIONS  (PRN):  acetaminophen   Tablet .. 650 milliGRAM(s) Oral every 6 hours PRN Mild Pain (1 - 3)  calcium carbonate    500 mG (Tums) Chewable 3 Tablet(s) Chew every 6 hours PRN Dyspepsia  dextrose 40% Gel 15 Gram(s) Oral once PRN Blood Glucose LESS THAN 70 milliGRAM(s)/deciliter  glucagon  Injectable 1 milliGRAM(s) IntraMuscular once PRN Glucose LESS THAN 70 milligrams/deciliter  melatonin 5 milliGRAM(s) Oral at bedtime PRN Sleep  ondansetron Injectable 4 milliGRAM(s) IV Push once PRN Nausea and/or Vomiting      Past medical history reviewed  Family history reviewed  Social history reviewed    PHYSICAL EXAM  Vital Signs Last 24 Hrs  T(C): 36.9 (19 Sep 2020 14:02), Max: 37.6 (19 Sep 2020 06:13)  T(F): 98.4 (19 Sep 2020 14:02), Max: 99.7 (19 Sep 2020 06:13)  HR: 104 (19 Sep 2020 14:02) (104 - 114)  BP: 122/78 (19 Sep 2020 14:02) (109/70 - 131/79)  BP(mean): --  RR: 18 (19 Sep 2020 14:02) (18 - 19)  SpO2: 100% (19 Sep 2020 14:02) (95% - 100%)    Constitutional:  in NAD.   HEENT: no proptosis or lid retraction  Neck: no thyromegaly or palpable thyroid nodules   Respiratory: lungs CTAB.  Cardiovascular: regular rhythm, normal S1 and S2, no audible murmurs, no peripheral edema  GI: soft, NT/ND, no masses/HSM appreciated.  Neurology: no tremors, DTR 2+  Skin: no visible rashes/lesions  Psychiatric: AAO x 3    LABS:                        9.8    16.19 )-----------( 381      ( 19 Sep 2020 07:10 )             30.2     -    137  |  96  |  7   ----------------------------<  130<H>  3.6   |  30  |  0.66    Ca    8.7      19 Sep 2020 07:10  Phos  2.2     -  Mg     1.9         TPro  8.5<H>  /  Alb  3.5  /  TBili  0.5  /  DBili  x   /  AST  15  /  ALT  13  /  AlkPhos  111  -17    PT/INR - ( 17 Sep 2020 15:27 )   PT: 14.7 sec;   INR: 1.24            Urinalysis Basic - ( 17 Sep 2020 15:50 )    Color: Yellow / Appearance: SL Cloudy / SG: <=1.005 / pH: x  Gluc: x / Ketone: 15 mg/dL  / Bili: Negative / Urobili: 1.0 E.U./dL   Blood: x / Protein: Trace mg/dL / Nitrite: NEGATIVE   Leuk Esterase: NEGATIVE / RBC: < 5 /HPF / WBC Many /HPF   Sq Epi: x / Non Sq Epi: 5-10 /HPF / Bacteria: Present /HPF      Thyroid Stimulating Hormone, Serum: 1.426 uIU/mL ( @ 15:27)      HbA1C: 11.2 % ( @ 15:27)      CAPILLARY BLOOD GLUCOSE      POCT Blood Glucose.: 197 mg/dL (19 Sep 2020 12:28)  POCT Blood Glucose.: 146 mg/dL (19 Sep 2020 08:11)  POCT Blood Glucose.: 145 mg/dL (18 Sep 2020 21:31)  POCT Blood Glucose.: 104 mg/dL (18 Sep 2020 17:42)    A/P:Mrs. Sidhu is a 56 yo F with PMH of T2DM, presenting with severe sepsis secondary to pyelonephritis likely due to gross glucosuria, and hyperglycemia.    1.  DM - type 2, uncontrolled, complicated  A1C: 11.2%  Weight: 45kg BMI 20  Cr/GRF: .074/106  c-peptide 0.7  SANTI, ICA pending    Please increase lantus to 10    units at night.  Please c/w lispro 2   units before each meal.  Please continue lispro low dose sliding scale four times daily with meals and at bedtime    Pt's fingerstick glucose goal is 100-180        Pt can follow up at discharge with [facility] by calling  to make an appointment.   Will discuss case with Dr. Navas  and update primary team

## 2020-09-20 LAB
-  AMPICILLIN/SULBACTAM: SIGNIFICANT CHANGE UP
-  AMPICILLIN: SIGNIFICANT CHANGE UP
-  CEFAZOLIN: SIGNIFICANT CHANGE UP
-  CEFTRIAXONE: SIGNIFICANT CHANGE UP
-  CIPROFLOXACIN: SIGNIFICANT CHANGE UP
-  GENTAMICIN: SIGNIFICANT CHANGE UP
-  NITROFURANTOIN: SIGNIFICANT CHANGE UP
-  PIPERACILLIN/TAZOBACTAM: SIGNIFICANT CHANGE UP
-  TOBRAMYCIN: SIGNIFICANT CHANGE UP
-  TRIMETHOPRIM/SULFAMETHOXAZOLE: SIGNIFICANT CHANGE UP
CULTURE RESULTS: SIGNIFICANT CHANGE UP
METHOD TYPE: SIGNIFICANT CHANGE UP
ORGANISM # SPEC MICROSCOPIC CNT: SIGNIFICANT CHANGE UP
ORGANISM # SPEC MICROSCOPIC CNT: SIGNIFICANT CHANGE UP
SPECIMEN SOURCE: SIGNIFICANT CHANGE UP

## 2020-09-21 LAB — ISLET CELL512 AB SER-ACNC: SIGNIFICANT CHANGE UP

## 2020-09-21 RX ORDER — CEFPODOXIME PROXETIL 100 MG
1 TABLET ORAL
Qty: 12 | Refills: 0
Start: 2020-09-21 | End: 2020-09-26

## 2020-09-22 LAB — ISLET CELL512 AB SER-SCNC: 0 NMOL/L — SIGNIFICANT CHANGE UP

## 2020-09-23 LAB
CULTURE RESULTS: SIGNIFICANT CHANGE UP
CULTURE RESULTS: SIGNIFICANT CHANGE UP
GAD65 AB SER-MCNC: 0.22 NMOL/L — HIGH
SPECIMEN SOURCE: SIGNIFICANT CHANGE UP
SPECIMEN SOURCE: SIGNIFICANT CHANGE UP

## 2020-09-24 DIAGNOSIS — B96.20 UNSPECIFIED ESCHERICHIA COLI [E. COLI] AS THE CAUSE OF DISEASES CLASSIFIED ELSEWHERE: ICD-10-CM

## 2020-09-24 DIAGNOSIS — R65.20 SEVERE SEPSIS WITHOUT SEPTIC SHOCK: ICD-10-CM

## 2020-09-24 DIAGNOSIS — N20.9 URINARY CALCULUS, UNSPECIFIED: ICD-10-CM

## 2020-09-24 DIAGNOSIS — Z79.4 LONG TERM (CURRENT) USE OF INSULIN: ICD-10-CM

## 2020-09-24 DIAGNOSIS — N10 ACUTE PYELONEPHRITIS: ICD-10-CM

## 2020-09-24 DIAGNOSIS — E11.65 TYPE 2 DIABETES MELLITUS WITH HYPERGLYCEMIA: ICD-10-CM

## 2020-09-24 DIAGNOSIS — Z80.0 FAMILY HISTORY OF MALIGNANT NEOPLASM OF DIGESTIVE ORGANS: ICD-10-CM

## 2020-09-24 DIAGNOSIS — A41.9 SEPSIS, UNSPECIFIED ORGANISM: ICD-10-CM

## 2020-09-24 DIAGNOSIS — E87.3 ALKALOSIS: ICD-10-CM

## 2020-09-24 DIAGNOSIS — D63.8 ANEMIA IN OTHER CHRONIC DISEASES CLASSIFIED ELSEWHERE: ICD-10-CM

## 2020-09-24 DIAGNOSIS — E43 UNSPECIFIED SEVERE PROTEIN-CALORIE MALNUTRITION: ICD-10-CM

## 2020-09-28 PROBLEM — Z00.00 ENCOUNTER FOR PREVENTIVE HEALTH EXAMINATION: Status: ACTIVE | Noted: 2020-09-28

## 2020-10-05 ENCOUNTER — APPOINTMENT (OUTPATIENT)
Dept: ENDOCRINOLOGY | Facility: CLINIC | Age: 55
End: 2020-10-05
Payer: COMMERCIAL

## 2020-10-05 VITALS
DIASTOLIC BLOOD PRESSURE: 80 MMHG | WEIGHT: 106 LBS | BODY MASS INDEX: 21.37 KG/M2 | HEART RATE: 98 BPM | SYSTOLIC BLOOD PRESSURE: 134 MMHG | HEIGHT: 59 IN

## 2020-10-05 DIAGNOSIS — Z23 ENCOUNTER FOR IMMUNIZATION: ICD-10-CM

## 2020-10-05 DIAGNOSIS — Z83.3 FAMILY HISTORY OF DIABETES MELLITUS: ICD-10-CM

## 2020-10-05 DIAGNOSIS — Z78.9 OTHER SPECIFIED HEALTH STATUS: ICD-10-CM

## 2020-10-05 DIAGNOSIS — Z87.891 PERSONAL HISTORY OF NICOTINE DEPENDENCE: ICD-10-CM

## 2020-10-05 DIAGNOSIS — Z80.0 FAMILY HISTORY OF MALIGNANT NEOPLASM OF DIGESTIVE ORGANS: ICD-10-CM

## 2020-10-05 DIAGNOSIS — Z82.49 FAMILY HISTORY OF ISCHEMIC HEART DISEASE AND OTHER DISEASES OF THE CIRCULATORY SYSTEM: ICD-10-CM

## 2020-10-05 PROBLEM — E11.9 TYPE 2 DIABETES MELLITUS WITHOUT COMPLICATIONS: Chronic | Status: ACTIVE | Noted: 2020-09-18

## 2020-10-05 LAB — HBA1C MFR BLD HPLC: 9.4

## 2020-10-05 PROCEDURE — 90686 IIV4 VACC NO PRSV 0.5 ML IM: CPT

## 2020-10-05 PROCEDURE — 83036 HEMOGLOBIN GLYCOSYLATED A1C: CPT | Mod: QW

## 2020-10-05 PROCEDURE — G0008: CPT

## 2020-10-05 PROCEDURE — 82962 GLUCOSE BLOOD TEST: CPT

## 2020-10-05 PROCEDURE — 99203 OFFICE O/P NEW LOW 30 MIN: CPT | Mod: 25

## 2020-10-05 RX ORDER — SITAGLIPTIN 100 MG/1
100 TABLET, FILM COATED ORAL
Qty: 90 | Refills: 0 | Status: DISCONTINUED | COMMUNITY
Start: 2020-04-28

## 2020-10-05 RX ORDER — PEN NEEDLE, DIABETIC 31 G X1/4"
32G X 4 MM NEEDLE, DISPOSABLE MISCELLANEOUS
Qty: 100 | Refills: 0 | Status: COMPLETED | COMMUNITY
Start: 2020-09-19

## 2020-10-05 RX ORDER — METFORMIN HYDROCHLORIDE 500 MG/1
500 TABLET, COATED ORAL
Qty: 180 | Refills: 0 | Status: DISCONTINUED | COMMUNITY
Start: 2020-04-28

## 2020-10-05 RX ORDER — CEFPODOXIME PROXETIL 200 MG/1
200 TABLET, FILM COATED ORAL
Qty: 12 | Refills: 0 | Status: COMPLETED | COMMUNITY
Start: 2020-09-21

## 2020-10-05 RX ORDER — BLOOD-GLUCOSE METER
W/DEVICE KIT MISCELLANEOUS
Refills: 0 | Status: ACTIVE | COMMUNITY
Start: 2020-09-18

## 2020-10-05 RX ORDER — INSULIN GLARGINE 300 U/ML
300 INJECTION, SOLUTION SUBCUTANEOUS
Qty: 9 | Refills: 0 | Status: COMPLETED | COMMUNITY
Start: 2020-07-28

## 2020-10-05 RX ORDER — DULAGLUTIDE 0.75 MG/.5ML
0.75 INJECTION, SOLUTION SUBCUTANEOUS
Qty: 6 | Refills: 0 | Status: DISCONTINUED | COMMUNITY
Start: 2020-07-28

## 2020-10-05 RX ORDER — SULFAMETHOXAZOLE AND TRIMETHOPRIM 800; 160 MG/1; MG/1
800-160 TABLET ORAL
Qty: 10 | Refills: 0 | Status: COMPLETED | COMMUNITY
Start: 2020-06-04

## 2020-10-05 RX ORDER — AMOXICILLIN 875 MG/1
875 TABLET, FILM COATED ORAL
Qty: 14 | Refills: 0 | Status: COMPLETED | COMMUNITY
Start: 2020-08-17

## 2020-10-07 NOTE — ASSESSMENT
[Carbohydrate Consistent Diet] : carbohydrate consistent diet [Importance of Diet and Exercise] : importance of diet and exercise to improve glycemic control, achieve weight loss and improve cardiovascular health [Hypoglycemia Management] : hypoglycemia management [Action and use of Insulin] : action and use of short and long-acting insulin [Self Monitoring of Blood Glucose] : self monitoring of blood glucose [Retinopathy Screening] : Patient was referred to ophthalmology for retinopathy screening [FreeTextEntry1] : 55 year old female with T2DM, now being treated as NASREEN/type 1. Sept 2020: c peptide 0.7, SANTI + 0.22, ICA neg. \par Today POC A1C 9.4% with . She is taking too much basal insulin and not enough premeal insulin.\par We discussed the pathology of NASREEN, and need for insulin going forward. \par Treatment: Stop Toujeo. Continue Lantus 10 units at bedtime. Increase humalog to 5 units before meals. Stop januvia and metformin.\par SMBG: Continue freestyle elsy. Data has been shared with clinic. \par Diet: Provided education on consistent carb diet. Discussed need to match insulin with amount of CHO. Gave written literature. Aim for 45-60gm carb for meal. No insulin to cover snacks at this time. Eliminate juice. Continue education on ICR, so she has more flexibility. RD at [facility].\par Exercise: to be addressed at [facility].\par Eyes: No retinopathy. Had exam today.\par Feet: Monofilament exam at [facility].\par Kidneys: No nephropathy. BP at goal. No on ACE or ARB. Needs UACR at [facility].\par Lipids: Not on statin. Needs lipid panel with next lab draw.\par \par RTO 4 weeks with me and 3 months with MD. Please contact clinic sooner with any hypo or hyperglycemia. Provided info to sign up for portal.

## 2020-10-07 NOTE — PHYSICAL EXAM
[Alert] : alert [Well Nourished] : well nourished [Healthy Appearance] : healthy appearance [No Acute Distress] : no acute distress [Well Developed] : well developed [Normal Voice/Communication] : normal voice communication [Normal Sclera/Conjunctiva] : normal sclera/conjunctiva [EOMI] : extra ocular movement intact [No Proptosis] : no proptosis [Normal Oropharynx] : the oropharynx was normal [No Respiratory Distress] : no respiratory distress [No Accessory Muscle Use] : no accessory muscle use [Normal Rate and Effort] : normal respiratory rate and effort [Normal Rate] : heart rate was normal [Regular Rhythm] : with a regular rhythm [No Edema] : no peripheral edema [Not Tender] : non-tender [Not Distended] : not distended [Soft] : abdomen soft [Normal Posterior Cervical Nodes] : no posterior cervical lymphadenopathy [No Spinal Tenderness] : no spinal tenderness [Spine Straight] : spine straight [Normal Gait] : normal gait [No Involuntary Movements] : no involuntary movements were seen [Normal Strength/Tone] : muscle strength and tone were normal [No Rash] : no rash [No Skin Lesions] : no skin lesions [No Tremors] : no tremors [Oriented x3] : oriented to person, place, and time [Normal Affect] : the affect was normal [Normal Insight/Judgement] : insight and judgment were intact [Normal Mood] : the mood was normal [No Stigmata of Cushings Syndrome] : no stigmata of Cushings Syndrome [No Clubbing, Cyanosis] : no clubbing  or cyanosis of the fingernails [Acanthosis Nigricans] : no acanthosis nigricans [No Sensory Deficits] : the sensory exam was normal to light touch and pinprick

## 2020-10-07 NOTE — HISTORY OF PRESENT ILLNESS
[Continuous Glucose Monitoring] : Continuous Glucose Monitoring: Yes [Priyanka] : Priyanka [Hypoglycemia] : Patient is hypoglycemic. [FreeTextEntry1] : 55 year old female with PMH of type 2 DM discharged from [facility] on 9/18 after being treated for pyelonephritis. On admission she was found to be markedly hyperglycemic with A1C 11%. Endocrine service followed in hospital.\par Today she is feeling well.\par Diabetes--NASREEN. Sept 2020: c peptide 0.7, SANTI + 0.22, ICA neg. \par Today POC A1C 9.4% with \par Current S/S: polyuria, polydipsia, blurry vision, N/V/D, weight loss or gain. NEGATIVE, except: polyuria, blurry vision, weight loss\par Duration of disease: 5 years.\par Current Treatment: Discharged on Lantus 10 units and humalog 4 units before meals. She did not realize she was to stop toujeo 25 units, so was taking that in addition to Lantus. She was only taking humalog 2 units before meals. She continued taking metformin and januvia.\par Past Treatment: Toujeo, metformin, januvia, trulicity(stopped due to continued weight loss and no improvement in BG).\par SMBG: Was not checking FSG prior to hospitalization. She was provided priyanka sensor in hospital as part of trial. She is having issues with current sensor. Provided her sample, and placed new one. Shared data with clinic. Also has contour next ez meter.\par Priyanka data upload and interpreted. \par DKA/HHS: Denies\par Hypoglycemia: 1%\par Eyes: has appt. today.\par Feet: no neuropathy. \par Kidneys: Cr 0.74 and . Not on ace or arb. BP at goal today.\par Lipids: No current panel. Not on statin.\par Family History: Diabetes, Thyroid, Autoimmune, Other Endocrinopathies, Cancer, Heart disease. NEGATIVE, except: T1DM, pancreatic cancer, heart disease\par Social History: former smoker, occasional alcohol, works as  at [employer]\par Diet: Breakfast-furina, apple slices, juice OR bagel and grapefruit OR cereal OR velasquez and egg. Lunch-Healthy choice dinner OR soup and sandwich on roll OR sardines and crackers. Dinner-peppersteak white rice and corn OR salmon, potatoes, vegetable. Snacks - pretzels OR banana OR apple and PB OR few grapes\par Exercise:\par Vaccines: Flu vaccine today.\par \par  [FreeTextEntry2] : 32 [FreeTextEntry3] : 67 [FreeTextEntry4] : 1 [de-identified] : 8.6 [FreeTextEntry5] : 221 [FreeTextEntry6] : 34

## 2020-11-02 ENCOUNTER — APPOINTMENT (OUTPATIENT)
Dept: ENDOCRINOLOGY | Facility: CLINIC | Age: 55
End: 2020-11-02
Payer: COMMERCIAL

## 2020-11-02 VITALS
SYSTOLIC BLOOD PRESSURE: 148 MMHG | WEIGHT: 108 LBS | BODY MASS INDEX: 21.81 KG/M2 | DIASTOLIC BLOOD PRESSURE: 88 MMHG | HEART RATE: 110 BPM

## 2020-11-02 LAB — GLUCOSE BLDC GLUCOMTR-MCNC: 264

## 2020-11-02 PROCEDURE — 82962 GLUCOSE BLOOD TEST: CPT

## 2020-11-02 PROCEDURE — 97802 MEDICAL NUTRITION INDIV IN: CPT

## 2020-11-02 PROCEDURE — 99072 ADDL SUPL MATRL&STAF TM PHE: CPT

## 2020-11-02 PROCEDURE — 95251 CONT GLUC MNTR ANALYSIS I&R: CPT

## 2020-11-02 PROCEDURE — 99214 OFFICE O/P EST MOD 30 MIN: CPT | Mod: 25

## 2020-11-04 NOTE — PHYSICAL EXAM
[Alert] : alert [Well Nourished] : well nourished [Healthy Appearance] : healthy appearance [No Acute Distress] : no acute distress [Well Developed] : well developed [Normal Voice/Communication] : normal voice communication [Normal Sclera/Conjunctiva] : normal sclera/conjunctiva [EOMI] : extra ocular movement intact [No Proptosis] : no proptosis [Normal Oropharynx] : the oropharynx was normal [No Respiratory Distress] : no respiratory distress [No Accessory Muscle Use] : no accessory muscle use [Normal Rate and Effort] : normal respiratory rate and effort [Normal Rate] : heart rate was normal [Regular Rhythm] : with a regular rhythm [No Edema] : no peripheral edema [Not Tender] : non-tender [Not Distended] : not distended [Soft] : abdomen soft [No Spinal Tenderness] : no spinal tenderness [Spine Straight] : spine straight [No Stigmata of Cushings Syndrome] : no stigmata of Cushings Syndrome [Normal Gait] : normal gait [No Clubbing, Cyanosis] : no clubbing  or cyanosis of the fingernails [No Involuntary Movements] : no involuntary movements were seen [Normal Strength/Tone] : muscle strength and tone were normal [No Rash] : no rash [No Skin Lesions] : no skin lesions [Right Foot Was Examined] : right foot ~C was examined [Left Foot Was Examined] : left foot ~C was examined [1+] : 1+ in the dorsalis pedis [Normal] : normal [Full ROM] : with full range of motion [2+] : 2+ in the dorsalis pedis [No Sensory Deficits] : the sensory exam was normal to light touch and pinprick [No Tremors] : no tremors [Normal Sensation on Monofilament Testing] : normal sensation on monofilament testing of lower extremities [Oriented x3] : oriented to person, place, and time [Normal Affect] : the affect was normal [Normal Insight/Judgement] : insight and judgment were intact [Normal Mood] : the mood was normal [Acanthosis Nigricans] : no acanthosis nigricans [Delayed in the Right Toes] : normal in the toes [Delayed in the Left Toes] : normal in the toes [Position Sense Dec.] : normal position sense at the level of the toes [Diminished Throughout Both Feet] : normal tactile sensation with monofilament testing throughout both feet

## 2020-11-04 NOTE — HISTORY OF PRESENT ILLNESS
[Continuous Glucose Monitoring] : Continuous Glucose Monitoring: Yes [Priyanka] : Priyanka [FreeTextEntry1] : 55 year old female with PMH of type 2 DM discharged from [facility] on 9/18 after being treated for pyelonephritis. On admission she was found to be markedly hyperglycemic with A1C 11%, and found to have NASREEN.\par Diabetes--NASREEN. Sept 2020: c peptide 0.7, SANTI + 0.22, ICA neg. \par Today POC A1C 8.3% with . She had tunafish sandwich on 2 slices of rye and 1/2 banana for lunch, with humalog 5 units. October 2020 9.4%.\par Duration of disease: 5 years.\par Treatment: Discharged on Lantus 10 units and humalog 8 units before meals. \par Past Treatment: Toujeo, metformin, januvia, trulicity(stopped due to continued weight loss and no improvement in BG).\par SMBG: Was not checking FSG prior to hospitalization. She was provided priyanka sensor in hospital as part of trial. She was given libre2 sensors, (though original sensors were prescribed), so it is not compatible with phone. Proved priyanka 2 reader to check BG until she can get them swapped out at pharmacy. Also has contour next ez meter.\par Priyanka data upload and interpreted. \par DKA/HHS: Denies\par Hypoglycemia: 1%\par Eyes: Last exam October 2020. No retinopathy.\par Feet: No neuropathy. \par Kidneys: Cr 0.74 and . Not on ace or arb. BP at goal today.\par Lipids: No current panel. Not on statin.\par Family History: Diabetes, Thyroid, Autoimmune, Other Endocrinopathies, Cancer, Heart disease. NEGATIVE, except: T1DM, pancreatic cancer, heart disease\par Social History: former smoker, occasional alcohol, works as  at [employer]\par Diet: Breakfast-furina, apple slices, juice OR bagel and grapefruit OR cereal OR velasquez and egg. Lunch-Healthy choice dinner OR soup and sandwich on roll OR sardines and crackers. Dinner-peppersteak white rice and corn OR salmon, potatoes, vegetable. Snacks - pretzels OR banana OR apple and PB OR few grapes\par Exercise: 5 flights of stairs to apt and walking in city.\par Vaccines: Flu vaccine today.\par \par  [FreeTextEntry2] : 51 [FreeTextEntry3] : 48 [FreeTextEntry4] : 1 [de-identified] : 7.7% [FreeTextEntry5] : 183 [FreeTextEntry6] : 28.5

## 2020-11-04 NOTE — ASSESSMENT
[Diabetes Foot Care] : diabetes foot care [Long Term Vascular Complications] : long term vascular complications of diabetes [Carbohydrate Consistent Diet] : carbohydrate consistent diet [Importance of Diet and Exercise] : importance of diet and exercise to improve glycemic control, achieve weight loss and improve cardiovascular health [Self Monitoring of Blood Glucose] : self monitoring of blood glucose [Retinopathy Screening] : Patient was referred to ophthalmology for retinopathy screening [FreeTextEntry1] : 55 year old female with T2DM, now being treated as NASREEN/type 1. \par Sept 2020: c peptide 0.7, SANTI + 0.22, ICA neg. \par POC A1C 8.3% with . Sept 2020 POC A1C 9.4%. \par Continues to have postprandial hyperglycemia.\par Treatment:  Continue Lantus 10 units at bedtime. Increase humalog to 8 units before meals + MISS. \par SMBG: Continue freestyle elsy. Data has been shared with clinic. \par Diet: RD today. Will assess how current premeal works and try to transition to ICR at next visit.\par Exercise: \par Eyes: No retinopathy. Eye exam October 2020.\par Feet: Monofilament exam normal November 2020. Podiatry referral provided.\par Kidneys: No nephropathy. BP at goal. No on ACE or ARB. Needs UACR at [place].\par Lipids: Not on statin. Needs lipid panel with next lab draw.\par She has right calf cramping and decreased pedal pulse. Referred to vascular for further evaluation.\par \par RTO 2 months with me. Please contact clinic sooner with any hypo or hyperglycemia. Provided info to sign up for portal.

## 2020-11-20 ENCOUNTER — APPOINTMENT (OUTPATIENT)
Dept: VASCULAR SURGERY | Facility: CLINIC | Age: 55
End: 2020-11-20

## 2021-01-04 ENCOUNTER — RESULT CHARGE (OUTPATIENT)
Age: 56
End: 2021-01-04

## 2021-01-04 ENCOUNTER — APPOINTMENT (OUTPATIENT)
Dept: ENDOCRINOLOGY | Facility: CLINIC | Age: 56
End: 2021-01-04
Payer: COMMERCIAL

## 2021-01-04 VITALS
SYSTOLIC BLOOD PRESSURE: 119 MMHG | BODY MASS INDEX: 22.98 KG/M2 | DIASTOLIC BLOOD PRESSURE: 62 MMHG | HEART RATE: 99 BPM | WEIGHT: 114 LBS | HEIGHT: 59 IN

## 2021-01-04 LAB
GLUCOSE BLDC GLUCOMTR-MCNC: 91
HBA1C MFR BLD HPLC: 8.4

## 2021-01-04 PROCEDURE — 99214 OFFICE O/P EST MOD 30 MIN: CPT | Mod: 25

## 2021-01-04 PROCEDURE — 83036 HEMOGLOBIN GLYCOSYLATED A1C: CPT | Mod: QW

## 2021-01-04 PROCEDURE — 82962 GLUCOSE BLOOD TEST: CPT

## 2021-01-04 PROCEDURE — 99072 ADDL SUPL MATRL&STAF TM PHE: CPT

## 2021-01-20 ENCOUNTER — APPOINTMENT (OUTPATIENT)
Dept: ENDOCRINOLOGY | Facility: CLINIC | Age: 56
End: 2021-01-20

## 2021-02-03 ENCOUNTER — APPOINTMENT (OUTPATIENT)
Dept: ENDOCRINOLOGY | Facility: CLINIC | Age: 56
End: 2021-02-03

## 2021-03-04 ENCOUNTER — APPOINTMENT (OUTPATIENT)
Dept: ENDOCRINOLOGY | Facility: CLINIC | Age: 56
End: 2021-03-04

## 2021-03-04 ENCOUNTER — NON-APPOINTMENT (OUTPATIENT)
Age: 56
End: 2021-03-04

## 2021-03-22 RX ORDER — BLOOD SUGAR DIAGNOSTIC
STRIP MISCELLANEOUS
Qty: 4 | Refills: 3 | Status: ACTIVE | COMMUNITY
Start: 2020-09-18 | End: 1900-01-01

## 2021-03-22 RX ORDER — LANCETS
EACH MISCELLANEOUS
Qty: 4 | Refills: 3 | Status: ACTIVE | COMMUNITY
Start: 2020-09-18 | End: 1900-01-01

## 2021-03-23 RX ORDER — PEN NEEDLE, DIABETIC 31 G X1/4"
32G X 4 MM NEEDLE, DISPOSABLE MISCELLANEOUS
Qty: 4 | Refills: 3 | Status: ACTIVE | COMMUNITY
Start: 2021-03-23 | End: 1900-01-01

## 2021-04-22 ENCOUNTER — APPOINTMENT (OUTPATIENT)
Dept: ENDOCRINOLOGY | Facility: CLINIC | Age: 56
End: 2021-04-22
Payer: COMMERCIAL

## 2021-04-22 VITALS
DIASTOLIC BLOOD PRESSURE: 84 MMHG | SYSTOLIC BLOOD PRESSURE: 149 MMHG | HEART RATE: 89 BPM | WEIGHT: 124 LBS | BODY MASS INDEX: 25.04 KG/M2

## 2021-04-22 PROCEDURE — 95251 CONT GLUC MNTR ANALYSIS I&R: CPT

## 2021-04-22 PROCEDURE — 99213 OFFICE O/P EST LOW 20 MIN: CPT | Mod: 25

## 2021-04-22 PROCEDURE — 99072 ADDL SUPL MATRL&STAF TM PHE: CPT

## 2021-04-26 LAB
ALBUMIN SERPL ELPH-MCNC: 4.7 G/DL
ALP BLD-CCNC: 87 U/L
ALT SERPL-CCNC: 29 U/L
ANION GAP SERPL CALC-SCNC: 11 MMOL/L
AST SERPL-CCNC: 32 U/L
BASOPHILS # BLD AUTO: 0.12 K/UL
BASOPHILS NFR BLD AUTO: 1.3 %
BILIRUB SERPL-MCNC: 0.2 MG/DL
BUN SERPL-MCNC: 16 MG/DL
C PEPTIDE SERPL-MCNC: 0.2 NG/ML
CALCIUM SERPL-MCNC: 10.3 MG/DL
CHLORIDE SERPL-SCNC: 100 MMOL/L
CHOLEST SERPL-MCNC: 272 MG/DL
CO2 SERPL-SCNC: 29 MMOL/L
CREAT SERPL-MCNC: 0.82 MG/DL
CREAT SPEC-SCNC: 60 MG/DL
EOSINOPHIL # BLD AUTO: 0.24 K/UL
EOSINOPHIL NFR BLD AUTO: 2.6 %
ESTIMATED AVERAGE GLUCOSE: 174 MG/DL
GLUCOSE SERPL-MCNC: 136 MG/DL
HBA1C MFR BLD HPLC: 7.7 %
HCT VFR BLD CALC: 39.9 %
HDLC SERPL-MCNC: 87 MG/DL
HGB BLD-MCNC: 12.1 G/DL
IMM GRANULOCYTES NFR BLD AUTO: 0.1 %
LDLC SERPL CALC-MCNC: 170 MG/DL
LYMPHOCYTES # BLD AUTO: 2.74 K/UL
LYMPHOCYTES NFR BLD AUTO: 30.2 %
MAN DIFF?: NORMAL
MCHC RBC-ENTMCNC: 27.2 PG
MCHC RBC-ENTMCNC: 30.3 GM/DL
MCV RBC AUTO: 89.7 FL
MICROALBUMIN 24H UR DL<=1MG/L-MCNC: 1.3 MG/DL
MICROALBUMIN/CREAT 24H UR-RTO: 21 MG/G
MONOCYTES # BLD AUTO: 0.46 K/UL
MONOCYTES NFR BLD AUTO: 5.1 %
NEUTROPHILS # BLD AUTO: 5.5 K/UL
NEUTROPHILS NFR BLD AUTO: 60.7 %
NONHDLC SERPL-MCNC: 184 MG/DL
PLATELET # BLD AUTO: 243 K/UL
POTASSIUM SERPL-SCNC: 4.7 MMOL/L
PROT SERPL-MCNC: 8.1 G/DL
RBC # BLD: 4.45 M/UL
RBC # FLD: 14.3 %
SODIUM SERPL-SCNC: 140 MMOL/L
T3 SERPL-MCNC: 127 NG/DL
T4 FREE SERPL-MCNC: 1.1 NG/DL
TRIGL SERPL-MCNC: 70 MG/DL
TSH SERPL-ACNC: 5.68 UIU/ML
WBC # FLD AUTO: 9.07 K/UL

## 2021-04-26 NOTE — ADDENDUM
[FreeTextEntry1] : 4/26/2021 AL\[place] Reviewed labs with pt. Will start atorvastatin 40mg daily.

## 2021-04-26 NOTE — ASSESSMENT
[Diabetes Foot Care] : diabetes foot care [Long Term Vascular Complications] : long term vascular complications of diabetes [Carbohydrate Consistent Diet] : carbohydrate consistent diet [Exercise/Effect on Glucose] : exercise/effect on glucose [Hypoglycemia Management] : hypoglycemia management [Action and use of Insulin] : action and use of short and long-acting insulin [Self Monitoring of Blood Glucose] : self monitoring of blood glucose [Retinopathy Screening] : Patient was referred to ophthalmology for retinopathy screening [FreeTextEntry1] : 55 year old female with T2DM, now being treated as NASREEN/type 1. \par \par NASREEN:\par Sept 2020: c peptide 0.7, SANTI + 0.22, ICA neg. April 2021: c peptide 0.2\par Hgb A1C: 10/20 - 9.4%, Jan 2021 - 8.4%, April 2021 - 7.7%\par Treatment: Decrease Lantus to 8 units at bedtime. Increase humalog to 10 units before meals + MISS. \par SMBG: Continue freestyle elsy. Data has been shared with clinic. \par Diet: Would estimate ICR of 5 at this time. Advised to eliminate juice.\par Exercise: Increase physical activity.\par Eyes: No retinopathy. Eye exam UTD 2021.\par Dentist: UTD 2021.\par Feet: Monofilament exam normal November 2020. \par Kidneys: No nephropathy. BP at goal. No on ACE or ARB. April 2021 - UACR negative.\par Lipids: April 2021 - , HDL 87, , nonHDL 184, TG 70. Start statin 40mg daily.\par \par Subclinical hypothyroidism:\par April 2021: TSH 5.68 (H), free T4 1.1, (N), TT3 127 (N). Sept 2020, TSH 1.4 (N). She currently denies hypothyroid symptoms, so we will continue to monitor. Thyroid abs at next visit.\par \par \par She has right calf cramping and decreased pedal pulse. Referred to vascular again (Carmelo and Ze)\par Will establish PCP.\par \par \par \par RTO 7/15/2021 with me and YAMIL

## 2021-04-26 NOTE — PHYSICAL EXAM
[Alert] : alert [Well Nourished] : well nourished [Healthy Appearance] : healthy appearance [No Acute Distress] : no acute distress [Well Developed] : well developed [Normal Voice/Communication] : normal voice communication [Normal Sclera/Conjunctiva] : normal sclera/conjunctiva [EOMI] : extra ocular movement intact [No Proptosis] : no proptosis [Normal Oropharynx] : the oropharynx was normal [Thyroid Not Enlarged] : the thyroid was not enlarged [No Thyroid Nodules] : no palpable thyroid nodules [No Respiratory Distress] : no respiratory distress [No Accessory Muscle Use] : no accessory muscle use [Normal Rate and Effort] : normal respiratory rate and effort [Clear to Auscultation] : lungs were clear to auscultation bilaterally [Normal PMI] : the apical impulse was normal [Normal S1, S2] : normal S1 and S2 [No Murmurs] : no murmurs [Normal Rate] : heart rate was normal [Regular Rhythm] : with a regular rhythm [No Rubs] : no pericardial rub [No Edema] : no peripheral edema [Not Tender] : non-tender [Not Distended] : not distended [Soft] : abdomen soft [No Spinal Tenderness] : no spinal tenderness [Spine Straight] : spine straight [No Stigmata of Cushings Syndrome] : no stigmata of Cushings Syndrome [Normal Gait] : normal gait [No Clubbing, Cyanosis] : no clubbing  or cyanosis of the fingernails [No Involuntary Movements] : no involuntary movements were seen [Normal Strength/Tone] : muscle strength and tone were normal [No Rash] : no rash [No Skin Lesions] : no skin lesions [Right Foot Was Examined] : right foot ~C was examined [Left Foot Was Examined] : left foot ~C was examined [1+] : 1+ in the dorsalis pedis [Normal] : normal [Full ROM] : with full range of motion [2+] : 2+ in the dorsalis pedis [No Sensory Deficits] : the sensory exam was normal to light touch and pinprick [No Tremors] : no tremors [Normal Sensation on Monofilament Testing] : normal sensation on monofilament testing of lower extremities [Oriented x3] : oriented to person, place, and time [Normal Affect] : the affect was normal [Normal Insight/Judgement] : insight and judgment were intact [Normal Mood] : the mood was normal [Acanthosis Nigricans] : no acanthosis nigricans [Delayed in the Right Toes] : normal in the toes [Delayed in the Left Toes] : normal in the toes [Position Sense Dec.] : normal position sense at the level of the toes [Diminished Throughout Both Feet] : normal tactile sensation with monofilament testing throughout both feet

## 2021-07-14 NOTE — DISCHARGE NOTE PROVIDER - NSDCACTIVITY_GEN_ALL_CORE
No restrictions Z Plasty Text: The lesion was extirpated to the level of the fat with a #15 scalpel blade.  Given the location of the defect, shape of the defect and the proximity to free margins a Z-plasty was deemed most appropriate for repair.  Using a sterile surgical marker, the appropriate transposition arms of the Z-plasty were drawn incorporating the defect and placing the expected incisions within the relaxed skin tension lines where possible.    The area thus outlined was incised deep to adipose tissue with a #15 scalpel blade.  The skin margins were undermined to an appropriate distance in all directions utilizing iris scissors.  The opposing transposition arms were then transposed into place in opposite direction and anchored with interrupted buried subcutaneous sutures.

## 2021-07-15 ENCOUNTER — APPOINTMENT (OUTPATIENT)
Dept: ENDOCRINOLOGY | Facility: CLINIC | Age: 56
End: 2021-07-15
Payer: COMMERCIAL

## 2021-07-15 VITALS
WEIGHT: 130 LBS | HEART RATE: 102 BPM | BODY MASS INDEX: 26.26 KG/M2 | SYSTOLIC BLOOD PRESSURE: 145 MMHG | DIASTOLIC BLOOD PRESSURE: 78 MMHG

## 2021-07-15 DIAGNOSIS — E78.5 HYPERLIPIDEMIA, UNSPECIFIED: ICD-10-CM

## 2021-07-15 DIAGNOSIS — E03.9 HYPOTHYROIDISM, UNSPECIFIED: ICD-10-CM

## 2021-07-15 LAB
GLUCOSE BLDC GLUCOMTR-MCNC: 201
HBA1C MFR BLD HPLC: 8.4

## 2021-07-15 PROCEDURE — 82962 GLUCOSE BLOOD TEST: CPT

## 2021-07-15 PROCEDURE — 99213 OFFICE O/P EST LOW 20 MIN: CPT | Mod: 25

## 2021-07-15 PROCEDURE — 83036 HEMOGLOBIN GLYCOSYLATED A1C: CPT | Mod: QW

## 2021-07-15 PROCEDURE — 99072 ADDL SUPL MATRL&STAF TM PHE: CPT

## 2021-07-19 LAB
ALBUMIN SERPL ELPH-MCNC: 4.7 G/DL
ALP BLD-CCNC: 91 U/L
ALT SERPL-CCNC: 16 U/L
ANION GAP SERPL CALC-SCNC: 12 MMOL/L
AST SERPL-CCNC: 22 U/L
BASOPHILS # BLD AUTO: 0.08 K/UL
BASOPHILS NFR BLD AUTO: 1 %
BILIRUB SERPL-MCNC: <0.2 MG/DL
BUN SERPL-MCNC: 14 MG/DL
CALCIUM SERPL-MCNC: 10 MG/DL
CHLORIDE SERPL-SCNC: 103 MMOL/L
CHOLEST SERPL-MCNC: 267 MG/DL
CO2 SERPL-SCNC: 26 MMOL/L
CREAT SERPL-MCNC: 0.81 MG/DL
CREAT SPEC-SCNC: 140 MG/DL
EOSINOPHIL # BLD AUTO: 0.15 K/UL
EOSINOPHIL NFR BLD AUTO: 1.8 %
ESTIMATED AVERAGE GLUCOSE: 197 MG/DL
GLUCOSE SERPL-MCNC: 149 MG/DL
HBA1C MFR BLD HPLC: 8.5 %
HCT VFR BLD CALC: 39.8 %
HDLC SERPL-MCNC: 71 MG/DL
HGB BLD-MCNC: 12.2 G/DL
IMM GRANULOCYTES NFR BLD AUTO: 0.4 %
LDLC SERPL CALC-MCNC: 175 MG/DL
LYMPHOCYTES # BLD AUTO: 2.22 K/UL
LYMPHOCYTES NFR BLD AUTO: 26.6 %
MAN DIFF?: NORMAL
MCHC RBC-ENTMCNC: 27.5 PG
MCHC RBC-ENTMCNC: 30.7 GM/DL
MCV RBC AUTO: 89.8 FL
MICROALBUMIN 24H UR DL<=1MG/L-MCNC: 1.6 MG/DL
MICROALBUMIN/CREAT 24H UR-RTO: 11 MG/G
MONOCYTES # BLD AUTO: 0.44 K/UL
MONOCYTES NFR BLD AUTO: 5.3 %
NEUTROPHILS # BLD AUTO: 5.44 K/UL
NEUTROPHILS NFR BLD AUTO: 64.9 %
NONHDLC SERPL-MCNC: 196 MG/DL
PLATELET # BLD AUTO: 204 K/UL
POTASSIUM SERPL-SCNC: 4.1 MMOL/L
PROT SERPL-MCNC: 7.6 G/DL
RBC # BLD: 4.43 M/UL
RBC # FLD: 14.2 %
SODIUM SERPL-SCNC: 141 MMOL/L
T3 SERPL-MCNC: 101 NG/DL
T4 FREE SERPL-MCNC: 1.1 NG/DL
THYROGLOB AB SERPL-ACNC: <20 IU/ML
THYROPEROXIDASE AB SERPL IA-ACNC: 20.7 IU/ML
TRIGL SERPL-MCNC: 107 MG/DL
TSH SERPL-ACNC: 5.13 UIU/ML
WBC # FLD AUTO: 8.36 K/UL

## 2021-07-19 NOTE — ASSESSMENT
[Diabetes Foot Care] : diabetes foot care [Long Term Vascular Complications] : long term vascular complications of diabetes [Carbohydrate Consistent Diet] : carbohydrate consistent diet [Self Monitoring of Blood Glucose] : self monitoring of blood glucose [Retinopathy Screening] : Patient was referred to ophthalmology for retinopathy screening [FreeTextEntry1] : 56 year old female with T2DM, now being treated as NASREEN/type 1. \par \par NASREEN:\par Sept 2020: c peptide 0.7, SANTI + 0.22, ICA neg. April 2021: c peptide 0.2\par Hgb A1C: 10/20 - 9.4%, Jan 2021 - 8.4%, April 2021 - 7.7%, July 2021 - 8.4%\par Treatment: No changes, as most hyperglycemia is diet induced. Continue Lantus to 8 units at bedtime. Continue humalog to 10 units before meals + MISS; 4 units with bedtime snack, though encouraged to eliminate because it provides no nutrition and raises sugar. Discussed swapping for popcorn, sugar-free jello, or small portion of nuts.\par SMBG: Continue freestyle elsy. Data has been shared with clinic. \par Diet: Would estimate ICR of 5 at this time. RD today.\par Exercise: Increase physical activity.\par Eyes: No retinopathy. Eye exam UTD 2021.\par Dentist: UTD 2021.\par Feet: Monofilament exam normal November 2020. \par Kidneys: No nephropathy. BP at goal. No on ACE or ARB. April 2021 - UACR negative.\par Lipids: April 2021 - , HDL 87, , nonHDL 184, TG 70. Start statin 40mg daily.\par July 2021 - , HDL 71, , non . Increase atorvastatin to 80mg at bedtime. Reduce amount of packaged foods/snacks in diet.\par \par Subclinical hypothyroidism:\par April 2021: TSH 5.68 (H), free T4 1.1, (N), TT3 127 (N). Sept 2020, TSH 1.4 (N). \par July 2021: TSH (5.1), free T4 1.1. Abs negative.\par She currently denies hypothyroid symptoms, so we will continue to monitor.\par \par \par She has right calf cramping and decreased pedal pulse. Vein clinic appt 7/24.\par Will establish PCP.\par \par \par \par RTO in 3 months.

## 2021-07-19 NOTE — HISTORY OF PRESENT ILLNESS
[FreeTextEntry1] : 56 year old female with PMH of type 2 DM discharged from [facility] on 9/18 after being treated for pyelonephritis. On admission she was found to be markedly hyperglycemic with A1C 11%, and found to have NASREEN. Discharged on Lantus 10 units and humalog 8 units before meals. \par Hgb A1C: 10/20 - 9.4%, Jan 2021 - 8.4%, April 2021 - 7.7%, July 2021 - 8.4%\par Diabetes--NASREEN. Sept 2020: c peptide 0.7, SANTI + 0.22, ICA neg. \par Diagnosed at age 50.\par Past Treatment: Toujeo, metformin, januvia, trulicity(stopped due to continued weight loss and no improvement in BG).\par SMBG: Freestyle priyanka. Also has contour next ez meter.\par Priyanka data upload and interpreted. \par DKA/HHS: Denies\par Eyes: Last exam October 2020. No retinopathy.\par Feet: No neuropathy. \par Kidneys: Cr 0.74 and . Not on ace or arb. BP at goal today.\par Lipids: No current panel. Not on statin.\par Family History: Diabetes, Thyroid, Autoimmune, Other Endocrinopathies, Cancer, Heart disease. NEGATIVE, except: T1DM, pancreatic cancer, heart disease\par Social History: former smoker, occasional alcohol, works as  at [employer]\par \par Interim History:\par No new health issues or medications. \par She gained 16lbs since Jan. Reports diet might have changed some and level of activity decreased since moving. \par She was previously diagnosed with H pylori, but did not complete treatment due to size of pills.\par Referred to vascular at last visit due to decreased pedal pulses. She has appt. at vein clinic 7/24. \par She is taking Lantus 10 units HS and humalog 10 units + MISS before meals.\par Freestyle priyanka uploaded and analyzed. See report.\par Avg . TIR 50%, Low 0%. GV 26.5%. Low eliminated since adjustment last visit and variability improved.\par She reports bedtime snacking: cheesits, chips. no insulin to cover. Results in high fasting BG.\par \par

## 2021-07-19 NOTE — ADDENDUM
[FreeTextEntry1] : 7/19/2021 AL\[place] Reviewed results with patient via phone. Increased atorvastatin to 80mg. Asymptomatic subclinical hypothyroidism. - no tx at this time.

## 2021-08-20 NOTE — HISTORY OF PRESENT ILLNESS
[FreeTextEntry1] : 55 year old female with PMH of type 2 DM discharged from [facility] on 9/18 after being treated for pyelonephritis. On admission she was found to be markedly hyperglycemic with A1C 11%, and found to have NASREEN. Discharged on Lantus 10 units and humalog 8 units before meals. \par Hgb A1C: 10/20 - 9.4%, Jan 2021 - 8.4%, April 2021 - 7.7%\par Diabetes--NASREEN. Sept 2020: c peptide 0.7, SANTI + 0.22, ICA neg. \par Diagnosed at age 50.\par Past Treatment: Toujeo, metformin, januvia, trulicity(stopped due to continued weight loss and no improvement in BG).\par SMBG: Freestyle priyanka. Also has contour next ez meter.\par Priyanka data upload and interpreted. \par DKA/HHS: Denies\par Eyes: Last exam October 2020. No retinopathy.\par Feet: No neuropathy. \par Kidneys: Cr 0.74 and . Not on ace or arb. BP at goal today.\par Lipids: No current panel. Not on statin.\par Family History: Diabetes, Thyroid, Autoimmune, Other Endocrinopathies, Cancer, Heart disease. NEGATIVE, except: T1DM, pancreatic cancer, heart disease\par Social History: former smoker, occasional alcohol, works as  at [employer]\par \par Interim History:\par She recently moved to [place].\par No new health issues or medications.\par She gained 10lbs since Jan. Reports diet might have changed some and level of activity decreased since moving. \par She was previously diagnosed with H pylori, but did not complete treatment due to size of pills.\par Referred to vascular at last visit due to decreased pedal pulses. She was unable to make appt. b/c there was nothing available.\par She is taking Lantus 10 units HS and humalog 8 units + MISS before meals.\par Freestyle priyanka uploaded and analyzed. See report.\par Avg . TIR 54%, Low 4%. GV 35.3%.\par Lows occur in the morning or when she skips meals suggesting it is from lantus dose. Some lows from taking humalog to far before meals or with grazing. She occasionally misses Lantus dose and BG does go up over night. Most hyperglycemia is postprandial.\par \par 
left upper arm

## 2021-11-01 ENCOUNTER — APPOINTMENT (OUTPATIENT)
Dept: ENDOCRINOLOGY | Facility: CLINIC | Age: 56
End: 2021-11-01
Payer: COMMERCIAL

## 2021-11-01 VITALS
SYSTOLIC BLOOD PRESSURE: 145 MMHG | WEIGHT: 136 LBS | DIASTOLIC BLOOD PRESSURE: 81 MMHG | BODY MASS INDEX: 27.47 KG/M2 | HEART RATE: 107 BPM

## 2021-11-01 LAB — GLUCOSE BLDC GLUCOMTR-MCNC: 264

## 2021-11-01 PROCEDURE — 99213 OFFICE O/P EST LOW 20 MIN: CPT | Mod: 25

## 2021-11-01 PROCEDURE — 82962 GLUCOSE BLOOD TEST: CPT

## 2021-11-01 PROCEDURE — 83036 HEMOGLOBIN GLYCOSYLATED A1C: CPT | Mod: QW

## 2021-11-01 NOTE — PHYSICAL EXAM
[Alert] : alert [Well Nourished] : well nourished [Healthy Appearance] : healthy appearance [No Acute Distress] : no acute distress [Well Developed] : well developed [Normal Voice/Communication] : normal voice communication [Normal Sclera/Conjunctiva] : normal sclera/conjunctiva [EOMI] : extra ocular movement intact [No Proptosis] : no proptosis [Normal Oropharynx] : the oropharynx was normal [Thyroid Not Enlarged] : the thyroid was not enlarged [No Thyroid Nodules] : no palpable thyroid nodules [No Respiratory Distress] : no respiratory distress [No Accessory Muscle Use] : no accessory muscle use [Normal Rate and Effort] : normal respiratory rate and effort [Clear to Auscultation] : lungs were clear to auscultation bilaterally [Normal PMI] : the apical impulse was normal [Normal S1, S2] : normal S1 and S2 [No Murmurs] : no murmurs [Normal Rate] : heart rate was normal [Regular Rhythm] : with a regular rhythm [No Rubs] : no pericardial rub [No Edema] : no peripheral edema [Not Tender] : non-tender [Not Distended] : not distended [Soft] : abdomen soft [No Spinal Tenderness] : no spinal tenderness [Spine Straight] : spine straight [No Stigmata of Cushings Syndrome] : no stigmata of Cushings Syndrome [Normal Gait] : normal gait [No Clubbing, Cyanosis] : no clubbing  or cyanosis of the fingernails [No Involuntary Movements] : no involuntary movements were seen [Normal Strength/Tone] : muscle strength and tone were normal [No Rash] : no rash [No Skin Lesions] : no skin lesions [Acanthosis Nigricans] : no acanthosis nigricans [Delayed in the Right Toes] : normal in the toes [1+] : 1+ in the dorsalis pedis [Normal] : normal [Full ROM] : with full range of motion [Delayed in the Left Toes] : normal in the toes [2+] : 2+ in the dorsalis pedis [Position Sense Dec.] : normal position sense at the level of the toes [Diminished Throughout Both Feet] : normal tactile sensation with monofilament testing throughout both feet [No Sensory Deficits] : the sensory exam was normal to light touch and pinprick [No Tremors] : no tremors [Normal Sensation on Monofilament Testing] : normal sensation on monofilament testing of lower extremities [Oriented x3] : oriented to person, place, and time [Normal Affect] : the affect was normal [Normal Insight/Judgement] : insight and judgment were intact [Normal Mood] : the mood was normal

## 2021-11-01 NOTE — HISTORY OF PRESENT ILLNESS
[FreeTextEntry1] : 56 year old female with PMH of type 2 DM discharged from [facility] on 9/18 after being treated for pyelonephritis. On admission she was found to be markedly hyperglycemic with A1C 11%, and found to have NASREEN. Discharged on Lantus 10 units and humalog 8 units before meals. \par Hgb A1C: 10/20 - 9.4%, Jan 2021 - 8.4%, April 2021 - 7.7%, July 2021 - 8.4%\par Diabetes--NASREEN. Sept 2020: c peptide 0.7, SANTI + 0.22, ICA neg. \par Diagnosed at age 50.\par Past Treatment: Toujeo, metformin, januvia, trulicity(stopped due to continued weight loss and no improvement in BG).\par SMBG: Freestyle priyanka. Also has contour next ez meter.\par Priyanka data upload and interpreted. \par DKA/HHS: Denies\par Eyes: Last exam October 2020. No retinopathy.\par Feet: No neuropathy. \par Kidneys: Cr 0.74 and . Not on ace or arb. BP at goal today.\par Lipids: No current panel. Not on statin.\par Family History: Diabetes, Thyroid, Autoimmune, Other Endocrinopathies, Cancer, Heart disease. NEGATIVE, except: T1DM, pancreatic cancer, heart disease\par Social History: former smoker, occasional alcohol, works as  at [employer]\par \par Interim History:\par No new health issues or medications. Family stress.\par No changes were made at last visit.\par POC A1C 7.8% (from 8.5%)\par She gained 21lbs since Jan. Reports diet might have changed some and level of activity decreased since moving. \par She was previously diagnosed with H pylori, but did not complete treatment due to size of pills.\par Referred to vascular at last visit due to decreased pedal pulses. She has appt. at vein clinic 7/24. \par She is taking Lantus 8 units HS and humalog 10 units + MISS before meals.\par Freestyle priyanka uploaded and analyzed. See report.\par Avg . TIR 38%, Low 4%. GV 32.1%. Lows overnight.\par \par \par

## 2021-11-02 LAB — HBA1C MFR BLD HPLC: 7.8

## 2021-11-11 RX ORDER — ATORVASTATIN CALCIUM 80 MG/1
80 TABLET, FILM COATED ORAL
Qty: 30 | Refills: 5 | Status: ACTIVE | COMMUNITY
Start: 2021-04-26 | End: 1900-01-01

## 2021-11-30 ENCOUNTER — NON-APPOINTMENT (OUTPATIENT)
Age: 56
End: 2021-11-30

## 2021-12-02 RX ORDER — ISOPROPYL ALCOHOL 70 ML/100ML
SWAB TOPICAL
Qty: 3 | Refills: 3 | Status: ACTIVE | COMMUNITY
Start: 2020-09-18 | End: 1900-01-01

## 2022-01-11 LAB — GLUCOSE BLDC GLUCOMTR-MCNC: 144

## 2022-02-07 ENCOUNTER — APPOINTMENT (OUTPATIENT)
Dept: ENDOCRINOLOGY | Facility: CLINIC | Age: 57
End: 2022-02-07
Payer: COMMERCIAL

## 2022-02-07 DIAGNOSIS — E13.9 OTHER SPECIFIED DIABETES MELLITUS W/OUT COMPLICATIONS: ICD-10-CM

## 2022-02-07 PROCEDURE — 99213 OFFICE O/P EST LOW 20 MIN: CPT | Mod: 95

## 2022-02-07 NOTE — ASSESSMENT
[FreeTextEntry1] : 56 year old female with T2DM, now being treated as NASREEN/type 1. \par \par NASREEN:\par Sept 2020: c peptide 0.7, SANTI + 0.22, ICA neg. April 2021: c peptide 0.2\par Hgb A1C: 10/20 - 9.4%, Jan 2021 - 8.4%, April 2021 - 7.7%, July 2021 - 8.4%\par Treatment: Decrease Lantus to 7 units at bedtime. Increase humalog to 13 units before breakfast and dinner and decrease to 8 units before lunch. Change SS from 1:25 to 1:50; 4 units with bedtime snack, though encouraged to eliminate because it provides no nutrition and raises sugar. Discussed swapping for popcorn, sugar-free jello, or small portion of nuts.\par SMBG: Continue freestyle elsy. Data has been shared with clinic. \par Diet: Would estimate ICR of 5 at this time. Advised RD for continued carb counting education. Due to variable carb amount in meals it is difficult to have a successful fixed dose. Goal 45gm CHO with each meal and 15gm with snack.\par Exercise: Increase physical activity.\par Eyes: No retinopathy. Eye exam UTD 2021.\par Dentist: UTD 2021.\par Feet: Monofilament exam normal November 2020. \par Kidneys: No nephropathy. BP at goal. No on ACE or ARB. April 2021 - UACR negative.\par Lipids: April 2021 - , HDL 87, , nonHDL 184, TG 70. Start statin 40mg daily.\par July 2021 - , HDL 71, , non . Increased atorvastatin to 80mg at bedtime July 2021. Reduce amount of packaged foods/snacks in diet.\par \par Subclinical hypothyroidism:\par April 2021: TSH 5.68 (H), free T4 1.1, (N), TT3 127 (N). Sept 2020, TSH 1.4 (N). \par July 2021: TSH (5.1), free T4 1.1. Abs negative.\par She currently denies hypothyroid symptoms, so we will continue to monitor.\par \par \par She has right calf cramping and decreased pedal pulse. Vein clinic appt 7/24.\par Will establish PCP.\par LAbs at next visit.\par Emailed new orders to patient as requested:\par \par Lantus 7 units at bedtime.\par \par Humalog before BREAKFAST and DINNER:\par FSG : 13 units\par -200: 14 units\par -250: 15 units\par -300: 16 units\par -350: 17 units\par FSG > 351: 18 units\par \par Humalog before LUNCH:\par FSG : 8 units\par -200: 9 units\par -250: 10 units\par -300: 11 units\par -350: 12 units\par FSG > 351: 13 units\par \par Try to limit juice.\par \par Follow up in 2 months and would advise another appt. with registered dietician.\par \par \par RTO in 2 months. [Long Term Vascular Complications] : long term vascular complications of diabetes [Carbohydrate Consistent Diet] : carbohydrate consistent diet [Importance of Diet and Exercise] : importance of diet and exercise to improve glycemic control, achieve weight loss and improve cardiovascular health [Self Monitoring of Blood Glucose] : self monitoring of blood glucose

## 2022-02-07 NOTE — HISTORY OF PRESENT ILLNESS
[Home] : at home, [unfilled] , at the time of the visit. [Medical Office: ([facility]___] : at the medical office located in  [Verbal consent obtained from patient] : the patient, [unfilled] [FreeTextEntry1] : 56 year old female with PMH of type 2 DM discharged from [facility] on 9/18 after being treated for pyelonephritis. On admission she was found to be markedly hyperglycemic with A1C 11%, and found to have NASREEN. Discharged on Lantus 10 units and humalog 8 units before meals. \par Hgb A1C: 10/20 - 9.4%, Jan 2021 - 8.4%, April 2021 - 7.7%, July 2021 - 8.4%\par Diabetes--NASREEN. Sept 2020: c peptide 0.7, SANTI + 0.22, ICA neg. \par Diagnosed at age 50.\par Past Treatment: Toujeo, metformin, januvia, trulicity(stopped due to continued weight loss and no improvement in BG).\par SMBG: Freestyle priyanka. Also has contour next ez meter.\par Priyanka data upload and interpreted. \par DKA/HHS: Denies\par Eyes: Last exam October 2020. No retinopathy.\par Feet: No neuropathy. \par Kidneys: Cr 0.74 and . Not on ace or arb. BP at goal today.\par Lipids: No current panel. Not on statin.\par Family History: Diabetes, Thyroid, Autoimmune, Other Endocrinopathies, Cancer, Heart disease. NEGATIVE, except: T1DM, pancreatic cancer, heart disease\par Social History: former smoker, occasional alcohol, works as  at [employer]\par \par Nov 2021\par No new health issues or medications. Family stress.\par No changes were made at last visit.\par POC A1C 7.8% (from 8.5%)\par She gained 21lbs since Jan. Reports diet might have changed some and level of activity decreased since moving. \par She was previously diagnosed with H pylori, but did not complete treatment due to size of pills.\par Referred to vascular at last visit due to decreased pedal pulses. She has appt. at vein clinic 7/24. \par She is taking Lantus 8 units HS and humalog 10 units + MISS before meals.\par Freestyle priyanka uploaded and analyzed. See report.\par Avg . TIR 38%, Low 4%. GV 32.1%. Lows overnight.\par \par Feb 2022:\par She broke her ankle Jan 2022 slipping in black ice. Family stress.\par At last visit we planned to decrease Lantus to 7 units, but she did not, and continued to take 8 units. We continued humalog 10 units + MISS before meals.\par Uploaded and analyzed freestyle priyanka: 1/24/2022 - 2/6/2022. TIR 49%, low 1%. HIghs are postprandial breakfast and dinner, not as much after lunch.\par Diet:\par 10AM - oatmeal and 1/2 banana OR english muffin and 1/2 banana or grapes. 3PM - healthy choice meal (range in carbs from 16 - 50gm OR tuna and cracker pack (20gm) and juice. 8PM - pizza OR salad OR fried chicken and rice OR pork chop, potato, and veg.\par She increased atorvastatin to 80mg daily.\par \par \par \par

## 2022-05-05 ENCOUNTER — APPOINTMENT (OUTPATIENT)
Dept: ENDOCRINOLOGY | Facility: CLINIC | Age: 57
End: 2022-05-05

## 2022-11-14 ENCOUNTER — APPOINTMENT (OUTPATIENT)
Dept: ENDOCRINOLOGY | Facility: CLINIC | Age: 57
End: 2022-11-14

## 2022-11-14 NOTE — ASSESSMENT
[Diabetes Foot Care] : diabetes foot care [Long Term Vascular Complications] : long term vascular complications of diabetes [Carbohydrate Consistent Diet] : carbohydrate consistent diet [Importance of Diet and Exercise] : importance of diet and exercise to improve glycemic control, achieve weight loss and improve cardiovascular health [Exercise/Effect on Glucose] : exercise/effect on glucose [Action and use of Insulin] : action and use of short and long-acting insulin [Self Monitoring of Blood Glucose] : self monitoring of blood glucose [Insulin Self-Administration] : insulin self-administration [Sick-Day Management] : sick-day management [Retinopathy Screening] : Patient was referred to ophthalmology for retinopathy screening [FreeTextEntry1] : 57 year old female with T2DM, now being treated as NASREEN/type 1. \par \par NASREEN:\par Sept 2020: c peptide 0.7, SANTI + 0.22, ICA neg. April 2021: c peptide 0.2\par Hgb A1C: 10/20 - 9.4%, Jan 2021 - 8.4%, April 2021 - 7.7%, July 2021 - 8.4%\par Treatment: Decrease Lantus to 7 units at bedtime. Increase humalog to 13 units before breakfast and dinner and decrease to 8 units before lunch. Change SS from 1:25 to 1:50; 4 units with bedtime snack, though encouraged to eliminate because it provides no nutrition and raises sugar. Discussed swapping for popcorn, sugar-free jello, or small portion of nuts.\par SMBG: Continue freestyle elsy. Data has been shared with clinic. \par Diet: Would estimate ICR of 5 at this time. Advised RD for continued carb counting education. Due to variable carb amount in meals it is difficult to have a successful fixed dose. Goal 45gm CHO with each meal and 15gm with snack.\par Exercise: Increase physical activity.\par Eyes: No retinopathy. Eye exam UTD 2021.\par Dentist: UTD 2021.\par Feet: Monofilament exam normal November 2020. \par Kidneys: No nephropathy. BP at goal. No on ACE or ARB. April 2021 - UACR negative.\par Lipids: April 2021 - , HDL 87, , nonHDL 184, TG 70. Start statin 40mg daily.\par July 2021 - , HDL 71, , non . Increased atorvastatin to 80mg at bedtime July 2021. Reduce amount of packaged foods/snacks in diet.\par \par Subclinical hypothyroidism:\par April 2021: TSH 5.68 (H), free T4 1.1, (N), TT3 127 (N). Sept 2020, TSH 1.4 (N). \par July 2021: TSH (5.1), free T4 1.1. Abs negative.\par She currently denies hypothyroid symptoms, so we will continue to monitor.\par \par \par She has right calf cramping and decreased pedal pulse. Vein clinic appt 7/24.\par Will establish PCP.\par LAbs at next visit.\par Emailed new orders to patient as requested:\par \par Lantus 7 units at bedtime.\par \par Humalog before BREAKFAST and DINNER:\par FSG : 13 units\par -200: 14 units\par -250: 15 units\par -300: 16 units\par -350: 17 units\par FSG > 351: 18 units\par \par Humalog before LUNCH:\par FSG : 8 units\par -200: 9 units\par -250: 10 units\par -300: 11 units\par -350: 12 units\par FSG > 351: 13 units\par \par Try to limit juice.\par Glucagon emergency\par Ketone strips.\par \par Follow up in 2 months and would advise another appt. with registered dietician.\par \par \par RTO in 2 months. \par \par

## 2022-11-14 NOTE — HISTORY OF PRESENT ILLNESS
[FreeTextEntry1] : 5 year old female with PMH of type 2 DM discharged from [facility] on 9/18 after being treated for pyelonephritis. On admission she was found to be markedly hyperglycemic with A1C 11%, and found to have NASREEN. Discharged on Lantus 10 units and humalog 8 units before meals. \par Hgb A1C: 10/20 - 9.4%, Jan 2021 - 8.4%, April 2021 - 7.7%, July 2021 - 8.4%\par Diabetes--NASREEN. Sept 2020: c peptide 0.7, SANTI + 0.22, ICA neg. \par Diagnosed at age 50.\par Past Treatment: Toujeo, metformin, januvia, trulicity(stopped due to continued weight loss and no improvement in BG).\par SMBG: Freestyle priyanka. Also has contour next ez meter.\par Priyanka data upload and interpreted. \par DKA/HHS: Denies\par Eyes: Last exam October 2020. No retinopathy.\par Feet: No neuropathy. \par Kidneys: Cr 0.74 and . Not on ace or arb. BP at goal today.\par Lipids: No current panel. Not on statin.\par Family History: Diabetes, Thyroid, Autoimmune, Other Endocrinopathies, Cancer, Heart disease. NEGATIVE, except: T1DM, pancreatic cancer, heart disease\par Social History: former smoker, occasional alcohol, works as  at [employer]\par \par Nov 2021\par No new health issues or medications. Family stress.\par No changes were made at last visit.\par POC A1C 7.8% (from 8.5%)\par She gained 21lbs since Jan. Reports diet might have changed some and level of activity decreased since moving. \par She was previously diagnosed with H pylori, but did not complete treatment due to size of pills.\par Referred to vascular at last visit due to decreased pedal pulses. She has appt. at vein clinic 7/24. \par She is taking Lantus 8 units HS and humalog 10 units + MISS before meals.\par Freestyle priyanka uploaded and analyzed. See report.\par Avg . TIR 38%, Low 4%. GV 32.1%. Lows overnight.\par \par Feb 2022:\par She broke her ankle Jan 2022 slipping in black ice. Family stress.\par At last visit we planned to decrease Lantus to 7 units, but she did not, and continued to take 8 units. We continued humalog 10 units + MISS before meals.\par Uploaded and analyzed freestyle priyanka: 1/24/2022 - 2/6/2022. TIR 49%, low 1%. HIghs are postprandial breakfast and dinner, not as much after lunch.\par Diet:\par 10AM - oatmeal and 1/2 banana OR english muffin and 1/2 banana or grapes. 3PM - healthy choice meal (range in carbs from 16 - 50gm OR tuna and cracker pack (20gm) and juice. 8PM - pizza OR salad OR fried chicken and rice OR pork chop, potato, and veg.\par She increased atorvastatin to 80mg daily.\par \par \par \par

## 2023-01-05 RX ORDER — INSULIN GLARGINE 100 [IU]/ML
100 INJECTION, SOLUTION SUBCUTANEOUS
Qty: 1 | Refills: 1 | Status: ACTIVE | COMMUNITY
Start: 2020-09-19 | End: 1900-01-01

## 2023-01-09 RX ORDER — FLASH GLUCOSE SENSOR
KIT MISCELLANEOUS
Qty: 6 | Refills: 3 | Status: ACTIVE | COMMUNITY
Start: 2020-09-18 | End: 1900-01-01

## 2023-06-28 ENCOUNTER — RX RENEWAL (OUTPATIENT)
Age: 58
End: 2023-06-28

## 2023-06-28 RX ORDER — INSULIN LISPRO 100 [IU]/ML
100 INJECTION, SOLUTION INTRAVENOUS; SUBCUTANEOUS
Qty: 60 | Refills: 3 | Status: ACTIVE | COMMUNITY
Start: 2020-09-19 | End: 1900-01-01

## 2024-01-02 ENCOUNTER — RX RENEWAL (OUTPATIENT)
Age: 59
End: 2024-01-02

## 2024-01-02 RX ORDER — PEN NEEDLE, DIABETIC 29 G X1/2"
31G X 5 MM NEEDLE, DISPOSABLE MISCELLANEOUS
Qty: 360 | Refills: 0 | Status: ACTIVE | COMMUNITY
Start: 2020-10-05 | End: 1900-01-01